# Patient Record
Sex: MALE | Race: BLACK OR AFRICAN AMERICAN | Employment: OTHER | URBAN - METROPOLITAN AREA
[De-identification: names, ages, dates, MRNs, and addresses within clinical notes are randomized per-mention and may not be internally consistent; named-entity substitution may affect disease eponyms.]

---

## 2017-04-06 ENCOUNTER — HOSPITAL ENCOUNTER (OUTPATIENT)
Dept: GENERAL RADIOLOGY | Facility: CLINIC | Age: 67
Discharge: HOME | End: 2017-04-06
Attending: INTERNAL MEDICINE | Admitting: INTERNAL MEDICINE
Payer: COMMERCIAL

## 2017-04-06 DIAGNOSIS — R05.9 COUGH: ICD-10-CM

## 2017-04-06 PROCEDURE — 71020 XR CHEST 2 VW: CPT

## 2017-06-05 DIAGNOSIS — R05.9 COUGH: Primary | ICD-10-CM

## 2017-06-05 DIAGNOSIS — R91.8 INFILTRATE OF LUNG PRESENT ON IMAGING OF CHEST: ICD-10-CM

## 2017-07-31 ENCOUNTER — PRE VISIT (OUTPATIENT)
Dept: OTOLARYNGOLOGY | Facility: CLINIC | Age: 67
End: 2017-07-31

## 2017-07-31 NOTE — TELEPHONE ENCOUNTER
1.  Date/reason for appt: 8/9/17 - Chronic Sinusitis   2.  Referring provider: [facility] Dr. Pearl  3.  Call to patient (Yes / No - short description): No  4.  Previous care at:    [facility] (saw Dr. Amador Spring 2016)    [facility]

## 2017-08-15 ENCOUNTER — PRE VISIT (OUTPATIENT)
Dept: PULMONOLOGY | Facility: CLINIC | Age: 67
End: 2017-08-15

## 2017-08-15 NOTE — TELEPHONE ENCOUNTER
1.  Date/reason for appt:8/18/17, Cough  2.  Referring provider: DAVINA MERINO  3.  Call to patient (Yes / No - short description): No, referred   4.  Previous care at / records requested from:   [facility]- faxed cover sheet.

## 2018-03-04 ENCOUNTER — MEDICAL CORRESPONDENCE (OUTPATIENT)
Dept: HEALTH INFORMATION MANAGEMENT | Facility: CLINIC | Age: 68
End: 2018-03-04

## 2018-03-06 ENCOUNTER — TELEPHONE (OUTPATIENT)
Dept: GASTROENTEROLOGY | Facility: CLINIC | Age: 68
End: 2018-03-06

## 2018-03-08 ENCOUNTER — TELEPHONE (OUTPATIENT)
Dept: GASTROENTEROLOGY | Facility: CLINIC | Age: 68
End: 2018-03-08

## 2018-03-08 ENCOUNTER — TRANSFERRED RECORDS (OUTPATIENT)
Dept: HEALTH INFORMATION MANAGEMENT | Facility: CLINIC | Age: 68
End: 2018-03-08

## 2018-03-08 ENCOUNTER — MEDICAL CORRESPONDENCE (OUTPATIENT)
Dept: HEALTH INFORMATION MANAGEMENT | Facility: CLINIC | Age: 68
End: 2018-03-08

## 2018-03-23 ENCOUNTER — PRE VISIT (OUTPATIENT)
Dept: GASTROENTEROLOGY | Facility: CLINIC | Age: 68
End: 2018-03-23

## 2018-03-23 NOTE — TELEPHONE ENCOUNTER
APPT INFO    Date /Time: 3/23/18 11:45AM    Reason for Appt: GERD dysphagia & constipation    Ref Provider/Clinic: ANGELIQUE RODRIGUEZ, [facility]   Are there internal records? Yes/No?  IF YES, list clinic names: No related recs or images in Epic    Are there outside records? Yes/No? Yes    Patient Contact (Y/N) & Call Details: No, pt was referred.    Action: Closing encounter      OUTSIDE RECORDS CHECKLIST     CLINIC NAME COMMENTS REC (x) IMG (x)   [facility] Referral scanned in Epic  X  None

## 2018-04-25 NOTE — TELEPHONE ENCOUNTER
FUTURE VISIT INFORMATION      FUTURE VISIT INFORMATION:    Date: 05/01/2018    Time: 10:30    [place] [facility] [place] INFORMATION:    Referring provider:  Dr. Vasiliy Paul     Referring providers clinic:  [facility]    Reason for visit/diagnosis  Dysphagia, throat pain    RECORDS REQUESTED FROM:       Clinic name Comments Records Status Imaging Status   [facility] OFFICE VISIT: 03/08/2018-01/05/2018 EXTERNAL NONE   [facility] [place]: 05/27/2016  IMAGE: CT MAXILLOFACIAL 06/30/2017  CT CHEST 06/30/2017, 04/06/2017,10/31/2016   INTERNAL YES                             RECORDS STATUS

## 2018-05-01 ENCOUNTER — PRE VISIT (OUTPATIENT)
Dept: OTOLARYNGOLOGY | Facility: CLINIC | Age: 68
End: 2018-05-01

## 2020-06-19 ENCOUNTER — HOSPITAL ENCOUNTER (OUTPATIENT)
Dept: LAB | Facility: CLINIC | Age: 70
End: 2020-06-19
Payer: COMMERCIAL

## 2020-09-10 ENCOUNTER — HOSPITAL ENCOUNTER (EMERGENCY)
Facility: CLINIC | Age: 70
Discharge: HOME | End: 2020-09-10
Attending: EMERGENCY MEDICINE | Admitting: EMERGENCY MEDICINE
Payer: COMMERCIAL

## 2020-09-10 ENCOUNTER — APPOINTMENT (OUTPATIENT)
Dept: INTERPRETER SERVICES | Facility: CLINIC | Age: 70
End: 2020-09-10
Payer: COMMERCIAL

## 2020-09-10 VITALS
TEMPERATURE: 97.7 F | OXYGEN SATURATION: 95 % | RESPIRATION RATE: 14 BRPM | HEART RATE: 80 BPM | SYSTOLIC BLOOD PRESSURE: 143 MMHG | DIASTOLIC BLOOD PRESSURE: 83 MMHG

## 2020-09-10 DIAGNOSIS — R50.9 SUBJECTIVE FEVER: ICD-10-CM

## 2020-09-10 DIAGNOSIS — E87.29 INCREASED ANION GAP METABOLIC ACIDOSIS: ICD-10-CM

## 2020-09-10 DIAGNOSIS — Z91.158 NON-COMPLIANCE WITH RENAL DIALYSIS (H): ICD-10-CM

## 2020-09-10 LAB
ALBUMIN SERPL-MCNC: 4.4 G/DL (ref 3.4–5)
ALP SERPL-CCNC: 66 U/L (ref 40–150)
ALT SERPL W P-5'-P-CCNC: 16 U/L (ref 0–70)
ANION GAP SERPL CALCULATED.3IONS-SCNC: 17 MMOL/L (ref 3–14)
AST SERPL W P-5'-P-CCNC: 11 U/L (ref 0–45)
BASE DEFICIT BLDV-SCNC: 15 MMOL/L
BASOPHILS # BLD AUTO: 0 10E9/L (ref 0–0.2)
BASOPHILS NFR BLD AUTO: 0.4 %
BILIRUB SERPL-MCNC: 0.4 MG/DL (ref 0.2–1.3)
BUN SERPL-MCNC: 165 MG/DL (ref 7–30)
CALCIUM SERPL-MCNC: 9.1 MG/DL (ref 8.5–10.1)
CHLORIDE SERPL-SCNC: 106 MMOL/L (ref 94–109)
CO2 SERPL-SCNC: 13 MMOL/L (ref 20–32)
CREAT SERPL-MCNC: 14.6 MG/DL (ref 0.66–1.25)
DIFFERENTIAL METHOD BLD: ABNORMAL
EOSINOPHIL # BLD AUTO: 0.2 10E9/L (ref 0–0.7)
EOSINOPHIL NFR BLD AUTO: 5.4 %
ERYTHROCYTE [DISTWIDTH] IN BLOOD BY AUTOMATED COUNT: 14.6 % (ref 10–15)
GFR SERPL CREATININE-BSD FRML MDRD: 3 ML/MIN/{1.73_M2}
GLUCOSE SERPL-MCNC: 162 MG/DL (ref 70–99)
HCO3 BLDV-SCNC: 13 MMOL/L (ref 21–28)
HCT VFR BLD AUTO: 29.7 % (ref 40–53)
HGB BLD-MCNC: 9.7 G/DL (ref 13.3–17.7)
IMM GRANULOCYTES # BLD: 0 10E9/L (ref 0–0.4)
IMM GRANULOCYTES NFR BLD: 0.2 %
INTERPRETATION ECG - MUSE: NORMAL
LABORATORY COMMENT REPORT: NORMAL
LYMPHOCYTES # BLD AUTO: 0.8 10E9/L (ref 0.8–5.3)
LYMPHOCYTES NFR BLD AUTO: 18.3 %
MAGNESIUM SERPL-MCNC: 2.9 MG/DL (ref 1.6–2.3)
MCH RBC QN AUTO: 28.7 PG (ref 26.5–33)
MCHC RBC AUTO-ENTMCNC: 32.7 G/DL (ref 31.5–36.5)
MCV RBC AUTO: 88 FL (ref 78–100)
MONOCYTES # BLD AUTO: 0.3 10E9/L (ref 0–1.3)
MONOCYTES NFR BLD AUTO: 6.5 %
NEUTROPHILS # BLD AUTO: 3.1 10E9/L (ref 1.6–8.3)
NEUTROPHILS NFR BLD AUTO: 69.2 %
NRBC # BLD AUTO: 0 10*3/UL
NRBC BLD AUTO-RTO: 0 /100
O2/TOTAL GAS SETTING VFR VENT: 21 %
PCO2 BLDV: 34 MM HG (ref 40–50)
PH BLDV: 7.17 PH (ref 7.32–7.43)
PHOSPHATE SERPL-MCNC: 8.8 MG/DL (ref 2.5–4.5)
PLATELET # BLD AUTO: 229 10E9/L (ref 150–450)
PO2 BLDV: 18 MM HG (ref 25–47)
POTASSIUM SERPL-SCNC: 4.7 MMOL/L (ref 3.4–5.3)
PROT SERPL-MCNC: 9.6 G/DL (ref 6.8–8.8)
RBC # BLD AUTO: 3.38 10E12/L (ref 4.4–5.9)
SARS-COV-2 RNA SPEC QL NAA+PROBE: NEGATIVE
SARS-COV-2 RNA SPEC QL NAA+PROBE: NORMAL
SODIUM SERPL-SCNC: 136 MMOL/L (ref 133–144)
SPECIMEN SOURCE: NORMAL
SPECIMEN SOURCE: NORMAL
WBC # BLD AUTO: 4.5 10E9/L (ref 4–11)

## 2020-09-10 PROCEDURE — 83735 ASSAY OF MAGNESIUM: CPT | Performed by: EMERGENCY MEDICINE

## 2020-09-10 PROCEDURE — 99284 EMERGENCY DEPT VISIT MOD MDM: CPT | Mod: 25 | Performed by: EMERGENCY MEDICINE

## 2020-09-10 PROCEDURE — 93010 ELECTROCARDIOGRAM REPORT: CPT | Mod: Z6 | Performed by: EMERGENCY MEDICINE

## 2020-09-10 PROCEDURE — 80053 COMPREHEN METABOLIC PANEL: CPT | Performed by: EMERGENCY MEDICINE

## 2020-09-10 PROCEDURE — 87040 BLOOD CULTURE FOR BACTERIA: CPT | Performed by: EMERGENCY MEDICINE

## 2020-09-10 PROCEDURE — U0003 INFECTIOUS AGENT DETECTION BY NUCLEIC ACID (DNA OR RNA); SEVERE ACUTE RESPIRATORY SYNDROME CORONAVIRUS 2 (SARS-COV-2) (CORONAVIRUS DISEASE [COVID-19]), AMPLIFIED PROBE TECHNIQUE, MAKING USE OF HIGH THROUGHPUT TECHNOLOGIES AS DESCRIBED BY CMS-2020-01-R: HCPCS | Performed by: EMERGENCY MEDICINE

## 2020-09-10 PROCEDURE — 84100 ASSAY OF PHOSPHORUS: CPT | Performed by: EMERGENCY MEDICINE

## 2020-09-10 PROCEDURE — 93005 ELECTROCARDIOGRAM TRACING: CPT | Performed by: EMERGENCY MEDICINE

## 2020-09-10 PROCEDURE — 82803 BLOOD GASES ANY COMBINATION: CPT | Performed by: EMERGENCY MEDICINE

## 2020-09-10 PROCEDURE — C9803 HOPD COVID-19 SPEC COLLECT: HCPCS | Performed by: EMERGENCY MEDICINE

## 2020-09-10 PROCEDURE — 85025 COMPLETE CBC W/AUTO DIFF WBC: CPT | Performed by: EMERGENCY MEDICINE

## 2020-09-10 SDOH — HEALTH STABILITY: MENTAL HEALTH: HOW OFTEN DO YOU HAVE A DRINK CONTAINING ALCOHOL?: NEVER

## 2020-09-10 ASSESSMENT — ENCOUNTER SYMPTOMS
CHILLS: 1
SORE THROAT: 1
COUGH: 0
RHINORRHEA: 1
FEVER: 1
SHORTNESS OF BREATH: 0
DYSURIA: 1
ABDOMINAL PAIN: 0
VOMITING: 0

## 2020-09-10 NOTE — ED NOTES
Pt seen dressed in street clothes heading toward exit. Stopped by staff, pt states, (through Ukrainian-speaking staff) that he has been waiting too long and wants to leave. Staff explained that MD would have to discuss health risks related to leaving through  before he can go. After much discussion, pt agreed to wait in room for MD and . [place] and drink given.  MD states she would like to speak with dialysis fellow before discharging pt AMA.

## 2020-09-10 NOTE — ED NOTES
Call 7B and spoke to charge. Charge reported that she doesn't have a clean room yet and that she will call back when she has more information

## 2020-09-10 NOTE — ED NOTES
Pt voided ~300mls in urinal. Urine was dumped in toilet before specimen was collected. Will attempt to collect urine spec upon next void.

## 2020-09-10 NOTE — ED NOTES
Susi charge on 7D called to update writer that they are not able to talk about taking the patient until after 1900.

## 2020-09-10 NOTE — ED NOTES
Writer had a conversation with patient via  about going to dialysis. Patient agreed. Patient understands that it may be an hour or so as the MD has to have a conversation. Patient wants to have some food and writer confirmed that it was ordered

## 2020-09-10 NOTE — ED PROVIDER NOTES
"ED Provider Note  [facility]      History     Chief Complaint   Patient presents with     Otalgia     Pt reports bilateral ear pain for \"long time,\" also states he has not had dialysis in over a month and feels sick     The history is provided by the patient, medical records and a friend.     Gemma Kessler is a 70 year old male with a past medical history significant for ESRD, DM2, HTN, renal cell carcinoma of left kidney s/p left nephrectomy, and hyperkalemia who presents here to the Emergency Department due to hearing loss and missed dialysis.  Patient reports bilateral hearing loss that he has been experiencing for two years that is worsening. He initially presented with c/o ear pain but says that it improved significantly since he arrived.  He states he has not been to dialysis in over one month because he did not like the center he went to and now he feels sick. He has been having fevers and chills.  Patient states he is still making urine.  Patient endorses burning and pain with urination.  Patient reports rhinorrhea and sore throat, but notes it might be due to allergies. Patient denies vomiting, chest pain, shortness of breath, and abdominal pain.  Patient denies smoking.  Patient states he normally goes to [facility]. He has not been doing dialysis because he does not like the center where he goes to get dialysis done.     Past Medical History  Past Medical History:   Diagnosis Date     Chronic sinusitis      Past Surgical History:   Procedure Laterality Date     ENT SURGERY       No current outpatient medications on file.    No Known Allergies  Family History  History reviewed. No pertinent family history.  Social History   Social History     Tobacco Use     Smoking status: Never Smoker     Smokeless tobacco: Never Used   Substance Use Topics     Alcohol use: Never     Frequency: Never     Drug use: Never      Past medical history, past surgical history, " medications, allergies, family history, and social history were reviewed with the patient. No additional pertinent items.       Review of Systems   Constitutional: Positive for chills and fever.   HENT: Positive for ear pain, hearing loss, rhinorrhea and sore throat.    Respiratory: Negative for cough and shortness of breath.    Cardiovascular: Negative for chest pain.   Gastrointestinal: Negative for abdominal pain and vomiting.   Genitourinary: Positive for dysuria.   All other systems reviewed and are negative.    A complete review of systems was performed with pertinent positives and negatives noted in the HPI, and all other systems negative.    Physical Exam   BP: (!) 144/69  Pulse: 80  Temp: 97.7  F (36.5  C)  Resp: 14  SpO2: 95 %  Physical Exam  Vitals signs and nursing note reviewed.   Constitutional:       General: He is not in acute distress.     Appearance: Normal appearance. He is well-developed and normal weight. He is not ill-appearing or diaphoretic.   HENT:      Head: Normocephalic and atraumatic.      Right Ear: Tympanic membrane, ear canal and external ear normal. There is no impacted cerumen.      Left Ear: Tympanic membrane, ear canal and external ear normal. There is no impacted cerumen.      Nose: Nose normal.      Mouth/Throat:      Mouth: Mucous membranes are dry.      Pharynx: Oropharynx is clear.   Eyes:      General: No scleral icterus.     Conjunctiva/sclera: Conjunctivae normal.   Neck:      Musculoskeletal: Normal range of motion and neck supple. No neck rigidity.   Cardiovascular:      Rate and Rhythm: Normal rate.   Pulmonary:      Effort: Pulmonary effort is normal. No respiratory distress.      Breath sounds: No stridor.   Chest:          Comments: Tunneled line in right apical chest wall. Labeled 8/19.   Abdominal:      General: Abdomen is flat. There is no distension.      Palpations: Abdomen is soft.      Tenderness: There is no abdominal tenderness. There is no guarding or  rebound.   Musculoskeletal: Normal range of motion.         General: No deformity or signs of injury.      Right lower leg: No edema.      Left lower leg: No edema.   Skin:     General: Skin is warm and dry.      Coloration: Skin is not jaundiced.      Findings: No bruising, erythema or rash.   Neurological:      General: No focal deficit present.      Mental Status: He is alert and oriented to person, place, and time.   Psychiatric:         Attention and Perception: Attention normal.         Mood and Affect: Mood normal.         Behavior: Behavior normal.         Thought Content: Thought content normal.         ED Course     12:12 PM  The patient was seen and examined by Smiley Kilpatrick MD in Room ED07.    Procedures             EKG Interpretation:      Interpreted by Smiley Kilpatrick MD  Time reviewed: 12:34  Symptoms at time of EKG: missed dialysis   Rhythm: normal sinus   Rate: normal  Axis: normal  Ectopy: none  Conduction: normal  ST Segments/ T Waves: No ST-T wave changes  Q Waves: none  Comparison to prior: No old EKG available    Clinical Impression: normal EKG                      Results for orders placed or performed during the hospital encounter of 09/10/20   CBC with platelets differential     Status: Abnormal   Result Value Ref Range    WBC 4.5 4.0 - 11.0 10e9/L    RBC Count 3.38 (L) 4.4 - 5.9 10e12/L    Hemoglobin 9.7 (L) 13.3 - 17.7 g/dL    Hematocrit 29.7 (L) 40.0 - 53.0 %    MCV 88 78 - 100 fl    MCH 28.7 26.5 - 33.0 pg    MCHC 32.7 31.5 - 36.5 g/dL    RDW 14.6 10.0 - 15.0 %    Platelet Count 229 150 - 450 10e9/L    Diff Method Automated Method     % Neutrophils 69.2 %    % Lymphocytes 18.3 %    % Monocytes 6.5 %    % Eosinophils 5.4 %    % Basophils 0.4 %    % Immature Granulocytes 0.2 %    Nucleated RBCs 0 0 /100    Absolute Neutrophil 3.1 1.6 - 8.3 10e9/L    Absolute Lymphocytes 0.8 0.8 - 5.3 10e9/L    Absolute Monocytes 0.3 0.0 - 1.3 10e9/L    Absolute Eosinophils 0.2 0.0 - 0.7 10e9/L     Absolute Basophils 0.0 0.0 - 0.2 10e9/L    Abs Immature Granulocytes 0.0 0 - 0.4 10e9/L    Absolute Nucleated RBC 0.0    Comprehensive metabolic panel     Status: Abnormal   Result Value Ref Range    Sodium 136 133 - 144 mmol/L    Potassium 4.7 3.4 - 5.3 mmol/L    Chloride 106 94 - 109 mmol/L    Carbon Dioxide 13 (L) 20 - 32 mmol/L    Anion Gap 17 (H) 3 - 14 mmol/L    Glucose 162 (H) 70 - 99 mg/dL    Urea Nitrogen 165 (H) 7 - 30 mg/dL    Creatinine 14.60 (H) 0.66 - 1.25 mg/dL    GFR Estimate 3 (L) >60 mL/min/[1.73_m2]    GFR Estimate If Black 3 (L) >60 mL/min/[1.73_m2]    Calcium 9.1 8.5 - 10.1 mg/dL    Bilirubin Total 0.4 0.2 - 1.3 mg/dL    Albumin 4.4 3.4 - 5.0 g/dL    Protein Total 9.6 (H) 6.8 - 8.8 g/dL    Alkaline Phosphatase 66 40 - 150 U/L    ALT 16 0 - 70 U/L    AST 11 0 - 45 U/L   Blood gas venous     Status: Abnormal   Result Value Ref Range    Ph Venous 7.17 (LL) 7.32 - 7.43 pH    PCO2 Venous 34 (L) 40 - 50 mm Hg    PO2 Venous 18 (L) 25 - 47 mm Hg    Bicarbonate Venous 13 (L) 21 - 28 mmol/L    Base Deficit Venous 15.0 mmol/L    FIO2 21    Asymptomatic COVID-19 Virus (Coronavirus) by PCR     Status: None    Specimen: Nasopharyngeal   Result Value Ref Range    COVID-19 Virus PCR to [facility] - Source Nasopharyngeal     COVID-19 Virus PCR to [facility] - Result       Test received-See reflex to IDDL test SARS CoV2 (COVID-19) Virus RT-PCR   Magnesium     Status: Abnormal   Result Value Ref Range    Magnesium 2.9 (H) 1.6 - 2.3 mg/dL   Phosphorus     Status: Abnormal   Result Value Ref Range    Phosphorus 8.8 (H) 2.5 - 4.5 mg/dL   SARS-CoV-2 COVID-19 Virus (Coronavirus) RT-PCR Nasopharyngeal     Status: None    Specimen: Nasopharyngeal   Result Value Ref Range    SARS-CoV-2 Virus Specimen Source Nasopharyngeal     SARS-CoV-2 PCR Result NEGATIVE     SARS-CoV-2 PCR Comment       Testing was performed using the [employer] Xpress SARS-CoV-2 Assay on the Cepheid Gene-Xpert   Instrument Systems. Additional information about  this Emergency Use Authorization (EUA)   assay can be found via the Lab Guide.     EKG 12-lead, tracing only     Status: None   Result Value Ref Range    Interpretation ECG Click View Image link to view waveform and result    Blood culture     Status: None (Preliminary result)    Specimen: Arm, Left; Blood    Left Arm   Result Value Ref Range    Specimen Description Blood Left Arm     Culture Micro PENDING    Blood culture     Status: None (Preliminary result)    Specimen: Arm; Blood    Right Arm   Result Value Ref Range    Specimen Description Blood Right Arm     Culture Micro PENDING      Medications - No data to display     Assessments & Plan (with Medical Decision Making)   Gemma Kessler is a 70 year old male with a past medical history significant for ESRD, DM2, HTN, renal cell carcinoma of left kidney s/p left nephrectomy, and hyperkalemia who presents here to the Emergency Department due to hearing loss and missed dialysis.     Ddx: hyperkalemia, uremia, hypervolemia, acidosis, UTI, bacteremia     Patient with generalized malaise after noncompliance with hemodialysis. Discussed he will likely need to be admitted to be dialyzed on [place] and patient agreed with this plan. Reports subjective fevers/chills but afebrile and well appearing in ED. I did discuss the danger of skipping dialysis including sudden cardiac death from hyperkalemia. Patient verbalized understanding. He would like to establish care at a different dialysis center.     Labs as above notable for nl potassium. Severe AG metabolic acidosis with pH 7.17. normocytic anemia likely from CKD. . EKG w/o arrhythmia. No acute ischemic changes. Asymptomatic COVID sent. Blood cultures sent for w/u reported fevers/chills. Clinically no e/o sepsis currently but does have central access which places patient at risk for bacteremia. Discussed with triage hospitalist and dialysis fellow on a three way call with [facility]. Will admit to [facility]  with plan to dialyze, probably not until tomorrow morning due to bed situation. They will look into sending patient to dialysis unit for dialysis where he could potentially await bed placement on medicine unit or he could be dialyzed and transferred back to inpatient bed on [facility]. Further disposition planning per triage hospitalist, nephrology, and PPM. Discussed giving bicarb, and nephrology recommended holding since patient is stable.     5:07 PM Patient wants to leave AMA. Called back PPM and other providers to see what the options were for getting patient dialyzed.     5:57 PM PPM called back with current triage hospitalist.  Per patient placement, patient's bed will be ready for him on 7D within an hour.  They are working on contacting the nephrology fellow to see if they can arrange for patient to be transferred to the dialysis unit to get dialyzed before he gets transferred to .  Otherwise the plan is to have him continue to go to 7D until the dialysis center is ready for him.  Patient updated on the plan for admitting him within the hour.  He is aware of the risks of leaving without dialysis.  I have signed out to my partner at shift change with patient's disposition pending transport to the [place].    I have reviewed the nursing notes. I have reviewed the findings, diagnosis, plan and need for follow up with the patient.    New Prescriptions    No medications on file       Final diagnoses:   Increased anion gap metabolic acidosis   Non-compliance with renal dialysis (H)   Subjective fever   I, Sally Lyons, am serving as a trained medical scribe to document services personally performed by Smiley Kilpatrick MD, based on the provider's statements to me.     ISmiley MD, was physically present and have reviewed and verified the accuracy of this note documented by Sally Lyons.     --  Smiley Kilpatrick MD  [facility], [place], EMERGENCY DEPARTMENT  9/10/2020     Smiley Kilpatrick MD  09/10/20  1750

## 2020-09-10 NOTE — ED NOTES
Writer, patient and MD Kilpatrick had a lengthy conversation about the risk associated leaving AMA. After much conversation patient verbalized that he understood the risks of leaving and that he would be back in the morning. Though the demonstrated understanding that he may have to wait many hours

## 2020-09-11 ENCOUNTER — HOSPITAL ENCOUNTER (EMERGENCY)
Facility: CLINIC | Age: 70
Discharge: ANOTHER HOSPITAL | End: 2020-09-11
Attending: EMERGENCY MEDICINE | Admitting: EMERGENCY MEDICINE
Payer: COMMERCIAL

## 2020-09-11 ENCOUNTER — HOSPITAL ENCOUNTER (OUTPATIENT)
Facility: CLINIC | Age: 70
Setting detail: OBSERVATION
Discharge: HOME | End: 2020-09-15
Attending: INTERNAL MEDICINE | Admitting: INTERNAL MEDICINE
Payer: COMMERCIAL

## 2020-09-11 ENCOUNTER — APPOINTMENT (OUTPATIENT)
Dept: INTERPRETER SERVICES | Facility: CLINIC | Age: 70
End: 2020-09-11
Payer: COMMERCIAL

## 2020-09-11 VITALS
RESPIRATION RATE: 16 BRPM | DIASTOLIC BLOOD PRESSURE: 75 MMHG | SYSTOLIC BLOOD PRESSURE: 154 MMHG | WEIGHT: 153.2 LBS | TEMPERATURE: 96.5 F | HEART RATE: 76 BPM | OXYGEN SATURATION: 99 %

## 2020-09-11 DIAGNOSIS — N19 UREMIA: ICD-10-CM

## 2020-09-11 DIAGNOSIS — N18.6 ESRD (END STAGE RENAL DISEASE) ON DIALYSIS (H): ICD-10-CM

## 2020-09-11 DIAGNOSIS — Z99.2 ESRD (END STAGE RENAL DISEASE) ON DIALYSIS (H): ICD-10-CM

## 2020-09-11 LAB
ANION GAP SERPL CALCULATED.3IONS-SCNC: 17 MMOL/L (ref 3–14)
BASE DEFICIT BLDV-SCNC: 14.6 MMOL/L
BASOPHILS # BLD AUTO: 0 10E9/L (ref 0–0.2)
BASOPHILS NFR BLD AUTO: 0.4 %
BUN SERPL-MCNC: 160 MG/DL (ref 7–30)
CALCIUM SERPL-MCNC: 9.1 MG/DL (ref 8.5–10.1)
CHLORIDE SERPL-SCNC: 108 MMOL/L (ref 94–109)
CO2 SERPL-SCNC: 14 MMOL/L (ref 20–32)
CREAT SERPL-MCNC: 15.2 MG/DL (ref 0.66–1.25)
DIFFERENTIAL METHOD BLD: ABNORMAL
EOSINOPHIL # BLD AUTO: 0.2 10E9/L (ref 0–0.7)
EOSINOPHIL NFR BLD AUTO: 5.1 %
ERYTHROCYTE [DISTWIDTH] IN BLOOD BY AUTOMATED COUNT: 14.6 % (ref 10–15)
GFR SERPL CREATININE-BSD FRML MDRD: 3 ML/MIN/{1.73_M2}
GLUCOSE SERPL-MCNC: 105 MG/DL (ref 70–99)
HCO3 BLDV-SCNC: 12 MMOL/L (ref 21–28)
HCT VFR BLD AUTO: 27.5 % (ref 40–53)
HGB BLD-MCNC: 9.4 G/DL (ref 13.3–17.7)
IMM GRANULOCYTES # BLD: 0 10E9/L (ref 0–0.4)
IMM GRANULOCYTES NFR BLD: 0.2 %
LYMPHOCYTES # BLD AUTO: 1 10E9/L (ref 0.8–5.3)
LYMPHOCYTES NFR BLD AUTO: 22.1 %
MCH RBC QN AUTO: 29.8 PG (ref 26.5–33)
MCHC RBC AUTO-ENTMCNC: 34.2 G/DL (ref 31.5–36.5)
MCV RBC AUTO: 87 FL (ref 78–100)
MONOCYTES # BLD AUTO: 0.5 10E9/L (ref 0–1.3)
MONOCYTES NFR BLD AUTO: 10.2 %
NEUTROPHILS # BLD AUTO: 2.9 10E9/L (ref 1.6–8.3)
NEUTROPHILS NFR BLD AUTO: 62 %
NRBC # BLD AUTO: 0 10*3/UL
NRBC BLD AUTO-RTO: 0 /100
O2/TOTAL GAS SETTING VFR VENT: 21 %
PCO2 BLDV: 32 MM HG (ref 40–50)
PH BLDV: 7.2 PH (ref 7.32–7.43)
PLATELET # BLD AUTO: 214 10E9/L (ref 150–450)
PO2 BLDV: 25 MM HG (ref 25–47)
POTASSIUM SERPL-SCNC: 4.5 MMOL/L (ref 3.4–5.3)
RBC # BLD AUTO: 3.15 10E12/L (ref 4.4–5.9)
SODIUM SERPL-SCNC: 139 MMOL/L (ref 133–144)
WBC # BLD AUTO: 4.7 10E9/L (ref 4–11)

## 2020-09-11 PROCEDURE — 99284 EMERGENCY DEPT VISIT MOD MDM: CPT | Mod: Z6 | Performed by: EMERGENCY MEDICINE

## 2020-09-11 PROCEDURE — 90937 HEMODIALYSIS REPEATED EVAL: CPT

## 2020-09-11 PROCEDURE — 80048 BASIC METABOLIC PNL TOTAL CA: CPT | Performed by: EMERGENCY MEDICINE

## 2020-09-11 PROCEDURE — 99207 ZZC CDG-CODE CATEGORY CHANGED: CPT | Performed by: PHYSICIAN ASSISTANT

## 2020-09-11 PROCEDURE — 85025 COMPLETE CBC W/AUTO DIFF WBC: CPT | Performed by: EMERGENCY MEDICINE

## 2020-09-11 PROCEDURE — G0257 UNSCHED DIALYSIS ESRD PT HOS: HCPCS

## 2020-09-11 PROCEDURE — 96374 THER/PROPH/DIAG INJ IV PUSH: CPT | Performed by: EMERGENCY MEDICINE

## 2020-09-11 PROCEDURE — G0378 HOSPITAL OBSERVATION PER HR: HCPCS

## 2020-09-11 PROCEDURE — 25000128 H RX IP 250 OP 636: Performed by: EMERGENCY MEDICINE

## 2020-09-11 PROCEDURE — 25800030 ZZH RX IP 258 OP 636: Performed by: INTERNAL MEDICINE

## 2020-09-11 PROCEDURE — 99220 ZZC INITIAL OBSERVATION CARE,LEVL III: CPT | Performed by: PHYSICIAN ASSISTANT

## 2020-09-11 PROCEDURE — 99285 EMERGENCY DEPT VISIT HI MDM: CPT | Mod: 25 | Performed by: EMERGENCY MEDICINE

## 2020-09-11 PROCEDURE — 82803 BLOOD GASES ANY COMBINATION: CPT | Performed by: EMERGENCY MEDICINE

## 2020-09-11 RX ORDER — ONDANSETRON 4 MG/1
4 TABLET, ORALLY DISINTEGRATING ORAL EVERY 6 HOURS PRN
Status: DISCONTINUED | OUTPATIENT
Start: 2020-09-11 | End: 2020-09-15 | Stop reason: HOSPADM

## 2020-09-11 RX ORDER — ONDANSETRON 2 MG/ML
4 INJECTION INTRAMUSCULAR; INTRAVENOUS EVERY 6 HOURS PRN
Status: DISCONTINUED | OUTPATIENT
Start: 2020-09-11 | End: 2020-09-15 | Stop reason: HOSPADM

## 2020-09-11 RX ORDER — CETIRIZINE HYDROCHLORIDE 10 MG/1
10 TABLET ORAL DAILY
COMMUNITY

## 2020-09-11 RX ORDER — NALOXONE HYDROCHLORIDE 0.4 MG/ML
.1-.4 INJECTION, SOLUTION INTRAMUSCULAR; INTRAVENOUS; SUBCUTANEOUS
Status: DISCONTINUED | OUTPATIENT
Start: 2020-09-11 | End: 2020-09-15 | Stop reason: HOSPADM

## 2020-09-11 RX ORDER — CALCIUM ACETATE 667 MG/1
667 CAPSULE ORAL
COMMUNITY

## 2020-09-11 RX ORDER — AMOXICILLIN 500 MG/1
500 CAPSULE ORAL DAILY
Status: ON HOLD | COMMUNITY
End: 2020-09-12

## 2020-09-11 RX ORDER — ACETAMINOPHEN 650 MG/1
650 SUPPOSITORY RECTAL EVERY 4 HOURS PRN
Status: DISCONTINUED | OUTPATIENT
Start: 2020-09-11 | End: 2020-09-15 | Stop reason: HOSPADM

## 2020-09-11 RX ORDER — AMLODIPINE BESYLATE 10 MG/1
10 TABLET ORAL DAILY
Status: ON HOLD | COMMUNITY
End: 2020-09-15

## 2020-09-11 RX ORDER — ONDANSETRON 2 MG/ML
4 INJECTION INTRAMUSCULAR; INTRAVENOUS EVERY 30 MIN PRN
Status: DISCONTINUED | OUTPATIENT
Start: 2020-09-11 | End: 2020-09-11 | Stop reason: HOSPADM

## 2020-09-11 RX ORDER — ACETAMINOPHEN 325 MG/1
650 TABLET ORAL EVERY 4 HOURS PRN
Status: DISCONTINUED | OUTPATIENT
Start: 2020-09-11 | End: 2020-09-15 | Stop reason: HOSPADM

## 2020-09-11 RX ORDER — ALBUMIN (HUMAN) 12.5 G/50ML
50 SOLUTION INTRAVENOUS
Status: DISCONTINUED | OUTPATIENT
Start: 2020-09-11 | End: 2020-09-11

## 2020-09-11 RX ORDER — TAMSULOSIN HYDROCHLORIDE 0.4 MG/1
0.4 CAPSULE ORAL DAILY
COMMUNITY

## 2020-09-11 RX ORDER — NITROGLYCERIN 0.4 MG/1
0.4 TABLET SUBLINGUAL EVERY 5 MIN PRN
COMMUNITY

## 2020-09-11 RX ORDER — PROCHLORPERAZINE 25 MG
12.5 SUPPOSITORY, RECTAL RECTAL EVERY 12 HOURS PRN
Status: DISCONTINUED | OUTPATIENT
Start: 2020-09-11 | End: 2020-09-15 | Stop reason: HOSPADM

## 2020-09-11 RX ORDER — AMOXICILLIN 250 MG
1 CAPSULE ORAL 2 TIMES DAILY PRN
Status: DISCONTINUED | OUTPATIENT
Start: 2020-09-11 | End: 2020-09-15 | Stop reason: HOSPADM

## 2020-09-11 RX ORDER — PROCHLORPERAZINE MALEATE 5 MG
5 TABLET ORAL EVERY 6 HOURS PRN
Status: DISCONTINUED | OUTPATIENT
Start: 2020-09-11 | End: 2020-09-15 | Stop reason: HOSPADM

## 2020-09-11 RX ORDER — AMOXICILLIN 250 MG
2 CAPSULE ORAL 2 TIMES DAILY PRN
Status: DISCONTINUED | OUTPATIENT
Start: 2020-09-11 | End: 2020-09-15 | Stop reason: HOSPADM

## 2020-09-11 RX ORDER — ALBUMIN, HUMAN INJ 5% 5 %
250 SOLUTION INTRAVENOUS
Status: DISCONTINUED | OUTPATIENT
Start: 2020-09-11 | End: 2020-09-11

## 2020-09-11 RX ORDER — CALCIUM CARBONATE 500 MG/1
1000 TABLET, CHEWABLE ORAL EVERY 4 HOURS PRN
Status: DISCONTINUED | OUTPATIENT
Start: 2020-09-11 | End: 2020-09-15 | Stop reason: HOSPADM

## 2020-09-11 RX ORDER — FLUTICASONE PROPIONATE 50 MCG
1 SPRAY, SUSPENSION (ML) NASAL DAILY
COMMUNITY

## 2020-09-11 RX ORDER — WARFARIN SODIUM 1 MG/1
1 TABLET ORAL DAILY
Status: ON HOLD | COMMUNITY
End: 2020-09-12

## 2020-09-11 RX ORDER — HYDROCHLOROTHIAZIDE 25 MG/1
25 TABLET ORAL DAILY
Status: ON HOLD | COMMUNITY
End: 2020-09-15

## 2020-09-11 RX ORDER — POLYETHYLENE GLYCOL 3350 17 G/17G
17 POWDER, FOR SOLUTION ORAL DAILY PRN
Status: DISCONTINUED | OUTPATIENT
Start: 2020-09-11 | End: 2020-09-15 | Stop reason: HOSPADM

## 2020-09-11 RX ORDER — ACETAMINOPHEN 500 MG
500-1000 TABLET ORAL EVERY 8 HOURS PRN
COMMUNITY

## 2020-09-11 RX ORDER — SIMVASTATIN 40 MG
40 TABLET ORAL AT BEDTIME
Status: ON HOLD | COMMUNITY
End: 2020-09-12

## 2020-09-11 RX ADMIN — ONDANSETRON 4 MG: 2 INJECTION INTRAMUSCULAR; INTRAVENOUS at 08:18

## 2020-09-11 RX ADMIN — SODIUM CHLORIDE 250 ML: 9 INJECTION, SOLUTION INTRAVENOUS at 17:43

## 2020-09-11 RX ADMIN — SODIUM CHLORIDE 300 ML: 9 INJECTION, SOLUTION INTRAVENOUS at 17:43

## 2020-09-11 RX ADMIN — Medication: at 17:43

## 2020-09-11 ASSESSMENT — ENCOUNTER SYMPTOMS
SHORTNESS OF BREATH: 0
ABDOMINAL PAIN: 0
FEVER: 0

## 2020-09-11 NOTE — PROGRESS NOTES
Admission    Patient arrives to room 621-1 via cart from Direct admit  Care plan note: Patient speaks German, jabber used for communication. Lungs clear on RA, vss. Creat/BUN elevated. Patient alert/orient X2-3. Dialysis to be done today.     Inpatient nursing criteria listed below were met:    PCD's Documented: NA  Skin issues/needs documented :Yes  Isolation education started/completed NA  Patient allergies verified with patient: NA  Verified completion of [place] Risk Assessment Tool:  Yes  Verified completion of Guardianship screening tool: Yes  Fall Prevention: Care plan updated, Education given and documented Yes  Care Plan initiated: Yes  Home medications documented in belongings flowsheet: NA  Patient belongings documented in belongings flowsheet: Yes  Reminder note (belongings/ medications) placed in discharge instructions:NA  Admission profile/ required documentation complete: NA  Bedside Report Letter given and explained to patient NA

## 2020-09-11 NOTE — H&P
"      [facility]    History and Physical - Hospitalist Service       Date of Admission:  9/11/2020    Assessment & Plan   Gmema Kessler is a 70 year old male with a past medical history of ESRD, diabetes mellitus type 2, renal cell carcinoma of the left kidney status post nephrectomy who presented to the emergency department at [facility] on 9/10/2020 with a chief complaint of bilateral otalgia, incidentally noting he had missed approximately 3 weeks of dialysis and felt ill.  Laboratory studies indicated a severe metabolic acidosis and need for urgent dialysis, patient initially left AMA due to wait times and then returned 9/11 with persistent generalized symptoms and requested admission for HD. Due to bed availability, pt was transferred to [facility] for treatment.    ESRD  Noncompliance  Metabolic acidosis, anion-gap  Previously arranged to undergo HD MWF, however patient describes \"issues\" with his dialysis center ([facility]). Unwilling to describe what said issues were, only indicates he requested transfer to an alternative facility which was not performed and therefore he decided to stop attending. Last HD run appears to be 8/26/20 per Care Everywhere. When asked if he had been in contact with his nephrologist ([facility]) regarding issues at HD center, pt does not answer. Labwork obtained in ED indicate uncompensated anion gap metabolic acidosis with , electrolytes surprisingly wnl.   - Nephrology consult  - Care Coordinator/SW consult to assist with alternative HD location    Hx RCC of left kidney s/p nephrectomy  Per summaries via [facility], was on palliative chemo earlier this year. Prior scans indicated bladder wall thickening. Unclear if this was followed up as an outpatient. Appears to have been lost to follow-up with Oncology/Urology.     Diabetes Mellitus Type II  Not on medications PTA. Last Hgb A1c is " from 2019.  - Repeat Hgb A1c  - Accuchecks QID, add ssi if needed    Hypertension  - PTA amlodipine    Hx HD catheter-related MSSA bacteremia  Hx IVC septic thrombophlebitis  Admission both at [facility] and [facility] 06/2020 (left AMA from [facility], presented to [facility] for definitive care). Suspected related to femoral HD catheter, findings of occlusive iliac and non-occlusive IVC thrombus presumed septic. Also complicated by septic lung abscesses. TTE & JUNIOR neg for vegetations. Completed 6-weeks of antibiotic tx, previously on warfarin tx for thrombus. No longer on chronic AC.    COVID rule-out  Asymptomatic PCR performed 9/10, NEGATIVE.     Diet: Renal Diet (non-dialysis)    DVT Prophylaxis: Pneumatic Compression Devices  Sahu Catheter: not present  Code Status: Full Code           Disposition Plan   Expected discharge: 1-2 days, recommended to prior living arrangement once outpatient HD arranged, cleared by nephrology.  Entered: Maynor Israel PA-C 09/11/2020, 1:52 PM     The patient's care was discussed with the Attending Physician, Dr. Mcdonough, Bedside Nurse and Patient.    Maynor Israel PA-C  [facility]    ______________________________________________________________________    Chief Complaint   Abnormal labs    History is obtained from the patient, EMR    History of Present Illness   Gemma Kessler is a 70 year old male with a past medical history of ESRD, diabetes mellitus type 2, renal cell carcinoma of the left kidney status post nephrectomy who presented to the emergency department at [facility] on 9/10/2020 with a chief complaint of bilateral otalgia, incidentally noting he had missed approximately 3 weeks of dialysis and felt ill.  Laboratory studies indicated a severe metabolic acidosis and need for urgent dialysis, patient initially left AMA due to wait times and then returned 9/11 with persistent generalized symptoms and requested admission for HD. Due to bed  "availability, pt was transferred to [facility] for treatment.    Patient is presently evaluated in hospital room. History obtained via  services on iPad. Patient solely indicates he \"has not been feeling well.\" Does not give additional symptoms. When asked why he has not been attending dialysis, he indicates having problems with his current clinic and states he has asked to be transferred multiple times without effect. Due to frustration, he decided to stop going to dialysis. For unclear reasons, he did not contact his nephrologist or attempt to find an alternative means to obtain hemodialysis. Denies fever, chills, cough. Is making small amount of urine. Denies back/flank pain or discomfort, nausea/vomiting, chest pain.     Review of Systems    The 10 point Review of Systems is negative other than noted in the HPI or here.     Past Medical History    I have reviewed this patient's medical history and updated it with pertinent information if needed.   Past Medical History:   Diagnosis Date     Chronic kidney disease      Chronic sinusitis        Past Surgical History   I have reviewed this patient's surgical history and updated it with pertinent information if needed.  Past Surgical History:   Procedure Laterality Date     ENT SURGERY         Social History   I have reviewed this [facility] social history and updated it with pertinent information if needed.  Social History     Tobacco Use     Smoking status: Never Smoker     Smokeless tobacco: Never Used   Substance Use Topics     Alcohol use: Never     Frequency: Never     Drug use: Never       Family History     No significant family history.    Prior to Admission Medications   None     Allergies   No Known Allergies    Physical Exam   Vital Signs: Temp: 97.9  F (36.6  C) Temp src: Oral BP: (!) 144/82 Pulse: 80   Resp: 16 SpO2: 100 % O2 Device: None (Room air)    Weight: 0 lbs 0 oz    GEN: well-developed, thin male, appears " comfortable  HEENT: NCAT, EOM intact bilaterally, sclera clear, conjunctiva normal, nose & mouth patent, mucous membranes moist  CHEST: lungs CTA bilaterally, no increased work of breathing, no wheeze, crackles, rhonchi  HEART: RRR, S1 & S2, no murmur  ABD: soft, nontender, nondistended, no guarding or rigidity, +BS in all 4 quadrants  MSK: AROM bilateral UE/LE, pedal & radial pulses 2+ bilaterally  NEURO: awake, alert. CN II-XII grossly intact. Sensation grossly intact to light touch.   SKIN: warm & dry without rash, no pedal edema    Data   Data reviewed today: I reviewed all medications, new labs and imaging results over the last 24 hours. I personally reviewed no images or EKG's today.    Recent Labs   Lab 09/11/20  0803 09/10/20  1206   WBC 4.7 4.5   HGB 9.4* 9.7*   MCV 87 88    229    136   POTASSIUM 4.5 4.7   CHLORIDE 108 106   CO2 14* 13*   * 165*   CR 15.20* 14.60*   ANIONGAP 17* 17*   HENOK 9.1 9.1   * 162*   ALBUMIN  --  4.4   PROTTOTAL  --  9.6*   BILITOTAL  --  0.4   ALKPHOS  --  66   ALT  --  16   AST  --  11     No results found for this or any previous visit (from the past 24 hour(s)).

## 2020-09-11 NOTE — CONSULTS
[facility]    RENAL CONSULTATION NOTE    REFERRING MD:  Maynor Israel PA-C    REASON FOR CONSULTATION:  ESKD, missed HD x 3 wks    DATE OF CONSULTATION: 09/11/20    SHORTHAND KEY FOR MY NOTES:  c = with, s = without, p = after, a = before, x = except, asx = asymptomatic, tx = transplant or treatment, sx = symptoms or symptomatic, cx = canceled or culture, rxn = reaction, yday = yesterday, nl = normal, abx = antibiotics, fxn = function, dx = diagnosis, dz = disease, m/h = melena/hematochezia, c/d/l/ha = cramping/dizziness/lightheadedness/headache, d/c = discharge or diarrhea/constipation, f/c/n/v = fevers/chills/nausea/vomiting, cp/sob = chest pain/shortness of breath, tbv = total body volume, rxn = reaction, tdc = tunneled dialysis catheter, pta = prior to admission, hd = hemodialysis, pd = peritoneal dialysis, hhd = home hemodialysis    HPI: Gemma Kessler is a 70 year old male c ESKD 2 DM2 / L nephrectomy who dialyses via a RIJ TDC at the [facility] under the care of Dr. Joy and was admitted on 9/11/2020 c c/o feeling ill and found to significant lab abnormalities.  Hx obtained from chart since  services were not available and pt doesn't speak much English.    Pt presented to the ER on 9/10 c B ear pain and labs were abn.  However, he left AMA bc he didn't want to wait.  He came back today bc he wasn't feeling well.  When they spoke to him, he said that he hadn't dialysed in 3 wks bc he doesn't like his unit.  He has tried to be transferred to no avail, so he decided not to go back.      He denies any pain and breathing is ok.    ROS:  Unable.    PMH:    Past Medical History:   Diagnosis Date     Chronic kidney disease      Chronic sinusitis      PSH:    Past Surgical History:   Procedure Laterality Date     ENT SURGERY       MEDICATIONS:    Reviewed    ALLERGIES:    Allergies as of 09/11/2020     (No Known Allergies)     FH:    No family history on file.    R/NC    SH:    Social History     Socioeconomic History     Marital status: Single     Spouse name: Not on file     Number of children: Not on file     Years of education: Not on file     Highest education level: Not on file   Occupational History     Not on file   Social Needs     Financial resource strain: Not on file     Food insecurity     Worry: Not on file     Inability: Not on file     Transportation needs     Medical: Not on file     Non-medical: Not on file   Tobacco Use     Smoking status: Never Smoker     Smokeless tobacco: Never Used   Substance and Sexual Activity     Alcohol use: Never     Frequency: Never     Drug use: Never     Sexual activity: Not on file   Lifestyle     Physical activity     Days per week: Not on file     Minutes per session: Not on file     Stress: Not on file   Relationships     Social connections     Talks on phone: Not on file     Gets together: Not on file     Attends Christianity service: Not on file     Active member of club or organization: Not on file     Attends meetings of clubs or organizations: Not on file     Relationship status: Not on file     Intimate partner violence     Fear of current or ex partner: Not on file     Emotionally abused: Not on file     Physically abused: Not on file     Forced sexual activity: Not on file   Other Topics Concern     Not on file   Social History Narrative     Not on file     PHYSICAL EXAM:    BP (!) 144/82 (BP Location: Right arm)   Pulse 80   Temp 97.9  F (36.6  C) (Oral)   Resp 16   SpO2 100%     GENERAL: awake, alert, NAD  HEENT:  normocephalic, no gross abnormalities; MMM, dentition is ok; pupils equal, EOMI, no scleral icterus or conj edema  CV: RRR c 1/6 m, no obv rub; no ble edema  RESP: CTA B c good efforts  GI: abd s/nd, intermittently tender RLQ; no masses  MUSCULOSKELETAL: extremities nl - no gross deformities noted  SKIN: no suspicious lesions or rashes, dry to touch  NEURO:  strength normal and symmetric  PSYCH: mood  good, affect appropriate  LYMPH: no palpable ant/post cervical and supraclavicular adenopathy  OTHER:  Access - RIJ TDC    LABS:      CBC RESULTS:     Recent Labs   Lab 09/11/20  0803 09/10/20  1206   WBC 4.7 4.5   RBC 3.15* 3.38*   HGB 9.4* 9.7*   HCT 27.5* 29.7*    229     BMP RESULTS:  Recent Labs   Lab 09/11/20  0803 09/10/20  1206    136   POTASSIUM 4.5 4.7   CHLORIDE 108 106   CO2 14* 13*   * 165*   CR 15.20* 14.60*   * 162*   HENOK 9.1 9.1     INRNo lab results found in last 7 days.     DIAGNOSTICS:  Personally reviewed ECG - NSR, no peaked Ts    A/P:  eGmma Kessler is a 70 year old male c ESKD who has missed 3 wks of dialysis and has acidosis and azotemia.    1.  ESKD.  Pt hasn't dialysed in a long time and the BUN/Cr are quite high.  He is not that volume up, likely bc he still makes urine.  We will run him today and tmrw c lower flow and shorter time to prevent dysequilibrium since the BUN is in the 160s.   A.  2.5h today, then 3h tmrw.  B.  Will need to find a new HD unit.  [address]?  [facility] goes there, too.    2.  Met acidosis.  This is due to the uremia.  It will correct c dialysis.  A.  Follow labs, clinically.    3.  HTN.  Pt's BP is slightly elevated.  A.  Follow BP, clinically.    4.  DM2.  Sugars are a little high.  A.  Per hospitalist team.    5.  FEN.  Electrolytes are ok.  A.  Dialysis diet.    Thank you for this consultation. We will follow c you.  Please call if any questions.    Attestation:   I have reviewed today's relevant vital signs, notes, medications, labs and imaging.    Maurice Martins MD  [facility] - Nephrology  [phone]

## 2020-09-11 NOTE — PROGRESS NOTES
Potassium   Date Value Ref Range Status   09/11/2020 4.5 3.4 - 5.3 mmol/L Final     Hemoglobin   Date Value Ref Range Status   09/11/2020 9.4 (L) 13.3 - 17.7 g/dL Final     Creatinine   Date Value Ref Range Status   09/11/2020 15.20 (H) 0.66 - 1.25 mg/dL Final     Urea Nitrogen   Date Value Ref Range Status   09/11/2020 160 (H) 7 - 30 mg/dL Final     Sodium   Date Value Ref Range Status   09/11/2020 139 133 - 144 mmol/L Final     No results found for: INR    DIALYSIS PROCEDURE NOTE  Hepatitis status of previous patient on machine log was checked and verified ok to use with this patients hepatitis status.  Patient dialyzed for 2.5 hrs. on a 3 K bath with a net fluid removal of  0.5L.  A BFR of 400 ml/min was obtained via a right tunneled catheter.  The treatment plan was discussed with Dr. Martins during the treatment.  Total heparin received during the treatment: 0 units.     Incapacitated Nurse education completed yes, Machine test alarm pass at 1430.  Line flushed, clamped and capped with heparin 1:1000 2.1 mL (2100 units) per lumen  Meds  given: none Complications: none  Catheter exit site cleaned and new dressing applied with a biopatch.  Exit site clean and dry, no redness or tenderness noted    Person educated patient, Knowledge base substantial,Barriers to learning language - only speaks and understands Ghanaian ,.   Skin Assessment pre run: cool and dry Pre run assessment of edema none noted  ICEBOAT? Timeout performed pre-treatment  I: Patient was identified using 2 identifiers  C: Consent obtained/verified current before treatment  E: Equipment preventative maintenance is current and dialysis delivery system OK to use  B: Hepatitis B Surface Antigen: negative; Draw Date: 1/24/20      Hepatitis B Surface Antibody: immune; Draw Date: 1/24/20  O: Dialysis orders present and complete prior to treatment  A: Vascular access verified and assessed prior to treatment  T: Treatment was performed at a clinically  appropriate time  ?: Patient was allowed to ask questions and address concerns prior to treatment  See flowsheet in EPIC for further details and post assessment.  Machine water alarm in place and functioning. Transducer pods intact and checked every 15min.  Skin post assessment:cool and dry Post Edema none present Pt dialyzed in his room  Report received from: Reynold AMARAL  Report given to: Reynold AMARAL  Chlorine/Chloramine water system checked every 4 hours.  Outpatient Dialysis at [facility] currently - wants to be moved to another facility

## 2020-09-11 NOTE — ED NOTES
Pt informed about transfer to [facility]. Agrees with plan. PCA- Warsan with pt but states that she will be going home and not with pt.

## 2020-09-11 NOTE — ED NOTES
[facility], [place]   ED Nurse to Floor Handoff     Gemma Kessler is a 70 year old male who speaks Spanish and lives alone,  in a home  They arrived in the ED by car from home    ED Chief Complaint: Abnormal Labs (Was seen here yesterday. Today presents to ED. )    ED Dx;   Final diagnoses:   Uremia   ESRD (end stage renal disease) on dialysis (H)         Needed?: Yes    Allergies: No Known Allergies.  Past Medical Hx:   Past Medical History:   Diagnosis Date     Chronic kidney disease      Chronic sinusitis       Baseline Mental status: WDL  Current Mental Status changes: at basesline    Infection present or suspected this encounter: no  Sepsis suspected: No  Isolation type: No active isolations     Activity level - Baseline/Home:  Independent  Activity Level - Current:   Independent    Bariatric equipment needed?: No    In the ED these meds were given:   Medications   ondansetron (ZOFRAN) injection 4 mg (4 mg Intravenous Given 9/11/20 0818)       Drips running?  No    Home pump  No    Current LDAs  Peripheral IV 09/11/20 Left (Active)   Site Assessment WDL 09/11/20 0806   Line Status Saline locked 09/11/20 0806   Phlebitis Scale 0-->no symptoms 09/11/20 0806   Infiltration Scale 0 09/11/20 0806   Extravasation? No 09/11/20 0806   Dressing Intervention New dressing  09/11/20 0806   Number of days: 0       Labs results:   Labs Ordered and Resulted from Time of ED Arrival Up to the Time of Departure from the ED   CBC WITH PLATELETS DIFFERENTIAL - Abnormal; Notable for the following components:       Result Value    RBC Count 3.15 (*)     Hemoglobin 9.4 (*)     Hematocrit 27.5 (*)     All other components within normal limits   BASIC METABOLIC PANEL - Abnormal; Notable for the following components:    Carbon Dioxide 14 (*)     Anion Gap 17 (*)     Glucose 105 (*)     Urea Nitrogen 160 (*)     Creatinine 15.20 (*)     GFR Estimate 3 (*)     GFR Estimate If Black 3 (*)     All  other components within normal limits   BLOOD GAS VENOUS - Abnormal; Notable for the following components:    Ph Venous 7.20 (*)     PCO2 Venous 32 (*)     Bicarbonate Venous 12 (*)     All other components within normal limits   UA MACROSCOPIC WITH REFLEX TO MICRO AND CULTURE       Imaging Studies: No results found for this or any previous visit (from the past 24 hour(s)).    Recent vital signs:   BP (!) 154/75   Pulse 76   Temp 96.5  F (35.8  C) (Oral)   Resp 16   Wt 69.5 kg (153 lb 3.2 oz)   SpO2 99%     Ridge Coma Scale Score: 15 (09/11/20 1052)       Cardiac Rhythm: Other  Pt needs tele? No  Skin/wound Issues: Very dry, itchy skin    Code Status: Full Code    Pain control: good    Nausea control: good    Abnormal labs/tests/findings requiring intervention: see EPIC    Family present during ED course? Yes   Family Comments/Social Situation comments: PCA with pt    Tasks needing completion: None    WYATT MAYA RN  [facility] -- [phone] [place] ED  [phone] [facility] ED

## 2020-09-11 NOTE — PLAN OF CARE
Summary:     DATE & TIME: 9/11/20 7-3pm  Cognitive Concerns/ Orientation : A&OX3, speaks somalian   BEHAVIOR & AGGRESSION TOOL COLOR: Green  CIWA SCORE: NA   ABNL VS/O2: Vss, on RA  MOBILITY: Sba  PAIN MANAGMENT: denies  DIET: Renal  BOWEL/BLADDER: Continent  ABNL LAB/BG: Creat 15.2   DRAIN/DEVICES: Right tunnel cath  TELEMETRY RHYTHM: NA  SKIN: NA  TESTS/PROCEDURES: Dialysis today  D/C DAY/GOALS/PLACE: Pending  OTHER IMPORTANT INFO:

## 2020-09-11 NOTE — ED PROVIDER NOTES
History     Chief Complaint   Patient presents with     Abnormal Labs     Was seen here yesterday. Today presents to ED.      HPI  Gemma Kessler is a 70 year old male with a past medical history significant for ESRD, DM2, HTN, renal cell carcinoma of left kidney s/p left nephrectomy, and hyperkalemia who presents here to the Emergency Department due to hearing loss and missed dialysis.  Patient denies vomiting, chest pain, shortness of breath, and abdominal pain.  Patient denies smoking.  Patient states he normally goes to [facility] and [facility]. He has not been doing dialysis because he does not like the center where he goes to get dialysis done. Patient states he is still making urine.  Patient endorses burning and pain with urination.  He was seen here yesterday and signed up for admission however he said he was waiting too long and so left.  He understands that as he comes in to see us today it will also take a long time to get a bed for him and states he will stay.  Denies any current fevers or chills.  He denies any shortness of breath, chest pain or abdominal pain.  He does note that he feels quite itchy    I have reviewed the Medications, Allergies, Past Medical and Surgical History, and Social History in the [facility] system.  PAST MEDICAL HISTORY:   Past Medical History:   Diagnosis Date     Chronic kidney disease      Chronic sinusitis        PAST SURGICAL HISTORY:   Past Surgical History:   Procedure Laterality Date     ENT SURGERY         Past medical history, past surgical history, medications, and allergies were reviewed with the patient. Additional pertinent items: None    FAMILY HISTORY: No family history on file.    SOCIAL HISTORY:   Social History     Tobacco Use     Smoking status: Never Smoker     Smokeless tobacco: Never Used   Substance Use Topics     Alcohol use: Never     Frequency: Never     Social history was reviewed with the patient. Additional pertinent items:  None      Patient's Medications    No medications on file        No Known Allergies     Review of Systems   Constitutional: Negative for fever.   Respiratory: Negative for shortness of breath.    Cardiovascular: Negative for chest pain.   Gastrointestinal: Negative for abdominal pain.   All other systems reviewed and are negative.    A complete review of systems was performed with pertinent positives and negatives noted in the HPI, and all other systems negative.       Physical Exam   BP: 134/74  Pulse: 90  Temp: 96.5  F (35.8  C)  Resp: 16  Weight: 69.5 kg (153 lb 3.2 oz)  SpO2: 100 %      Physical Exam  Constitutional:       General: He is not in acute distress.     Appearance: He is not diaphoretic.   HENT:      Head: Normocephalic.      Mouth/Throat:      Pharynx: No oropharyngeal exudate.   Eyes:      Extraocular Movements: Extraocular movements intact.   Neck:      Musculoskeletal: Neck supple.   Cardiovascular:      Heart sounds: Normal heart sounds.   Pulmonary:      Effort: No respiratory distress.      Breath sounds: Normal breath sounds.      Comments: Right upper chest tunneled dialysis catheter  Abdominal:      General: There is no distension.      Palpations: Abdomen is soft.      Tenderness: There is no abdominal tenderness.   Musculoskeletal:         General: No deformity.   Skin:     General: Skin is dry.   Neurological:      Mental Status: He is alert.      Comments: alert   Psychiatric:         Behavior: Behavior normal.         ED Course        Procedures           Results for orders placed or performed during the hospital encounter of 09/11/20 (from the past 24 hour(s))   CBC with platelets differential   Result Value Ref Range    WBC 4.7 4.0 - 11.0 10e9/L    RBC Count 3.15 (L) 4.4 - 5.9 10e12/L    Hemoglobin 9.4 (L) 13.3 - 17.7 g/dL    Hematocrit 27.5 (L) 40.0 - 53.0 %    MCV 87 78 - 100 fl    MCH 29.8 26.5 - 33.0 pg    MCHC 34.2 31.5 - 36.5 g/dL    RDW 14.6 10.0 - 15.0 %    Platelet Count 214  150 - 450 10e9/L    Diff Method Automated Method     % Neutrophils 62.0 %    % Lymphocytes 22.1 %    % Monocytes 10.2 %    % Eosinophils 5.1 %    % Basophils 0.4 %    % Immature Granulocytes 0.2 %    Nucleated RBCs 0 0 /100    Absolute Neutrophil 2.9 1.6 - 8.3 10e9/L    Absolute Lymphocytes 1.0 0.8 - 5.3 10e9/L    Absolute Monocytes 0.5 0.0 - 1.3 10e9/L    Absolute Eosinophils 0.2 0.0 - 0.7 10e9/L    Absolute Basophils 0.0 0.0 - 0.2 10e9/L    Abs Immature Granulocytes 0.0 0 - 0.4 10e9/L    Absolute Nucleated RBC 0.0    Basic metabolic panel   Result Value Ref Range    Sodium 139 133 - 144 mmol/L    Potassium 4.5 3.4 - 5.3 mmol/L    Chloride 108 94 - 109 mmol/L    Carbon Dioxide 14 (L) 20 - 32 mmol/L    Anion Gap 17 (H) 3 - 14 mmol/L    Glucose 105 (H) 70 - 99 mg/dL    Urea Nitrogen 160 (H) 7 - 30 mg/dL    Creatinine 15.20 (H) 0.66 - 1.25 mg/dL    GFR Estimate 3 (L) >60 mL/min/[1.73_m2]    GFR Estimate If Black 3 (L) >60 mL/min/[1.73_m2]    Calcium 9.1 8.5 - 10.1 mg/dL   Blood gas venous   Result Value Ref Range    Ph Venous 7.20 (L) 7.32 - 7.43 pH    PCO2 Venous 32 (L) 40 - 50 mm Hg    PO2 Venous 25 25 - 47 mm Hg    Bicarbonate Venous 12 (L) 21 - 28 mmol/L    Base Deficit Venous 14.6 mmol/L    FIO2 21      Medications   ondansetron (ZOFRAN) injection 4 mg (4 mg Intravenous Given 9/11/20 0818)      Results for orders placed or performed during the hospital encounter of 09/11/20   CBC with platelets differential     Status: Abnormal   Result Value Ref Range    WBC 4.7 4.0 - 11.0 10e9/L    RBC Count 3.15 (L) 4.4 - 5.9 10e12/L    Hemoglobin 9.4 (L) 13.3 - 17.7 g/dL    Hematocrit 27.5 (L) 40.0 - 53.0 %    MCV 87 78 - 100 fl    MCH 29.8 26.5 - 33.0 pg    MCHC 34.2 31.5 - 36.5 g/dL    RDW 14.6 10.0 - 15.0 %    Platelet Count 214 150 - 450 10e9/L    Diff Method Automated Method     % Neutrophils 62.0 %    % Lymphocytes 22.1 %    % Monocytes 10.2 %    % Eosinophils 5.1 %    % Basophils 0.4 %    % Immature Granulocytes 0.2 %     Nucleated RBCs 0 0 /100    Absolute Neutrophil 2.9 1.6 - 8.3 10e9/L    Absolute Lymphocytes 1.0 0.8 - 5.3 10e9/L    Absolute Monocytes 0.5 0.0 - 1.3 10e9/L    Absolute Eosinophils 0.2 0.0 - 0.7 10e9/L    Absolute Basophils 0.0 0.0 - 0.2 10e9/L    Abs Immature Granulocytes 0.0 0 - 0.4 10e9/L    Absolute Nucleated RBC 0.0    Basic metabolic panel     Status: Abnormal   Result Value Ref Range    Sodium 139 133 - 144 mmol/L    Potassium 4.5 3.4 - 5.3 mmol/L    Chloride 108 94 - 109 mmol/L    Carbon Dioxide 14 (L) 20 - 32 mmol/L    Anion Gap 17 (H) 3 - 14 mmol/L    Glucose 105 (H) 70 - 99 mg/dL    Urea Nitrogen 160 (H) 7 - 30 mg/dL    Creatinine 15.20 (H) 0.66 - 1.25 mg/dL    GFR Estimate 3 (L) >60 mL/min/[1.73_m2]    GFR Estimate If Black 3 (L) >60 mL/min/[1.73_m2]    Calcium 9.1 8.5 - 10.1 mg/dL   Blood gas venous     Status: Abnormal   Result Value Ref Range    Ph Venous 7.20 (L) 7.32 - 7.43 pH    PCO2 Venous 32 (L) 40 - 50 mm Hg    PO2 Venous 25 25 - 47 mm Hg    Bicarbonate Venous 12 (L) 21 - 28 mmol/L    Base Deficit Venous 14.6 mmol/L    FIO2 21             Assessments & Plan (with Medical Decision Making)   70-year-old male presents to us with a chief complaint of needing dialysis.  Labs do show significant uremia and patient is quite itchy as a result.  He does not want to go back to his normal dialysis clinic and dialysis Center.  We will admit the patient however the main [facility] hospital is quite full right now with no available beds.  I discussed care with Dr. Sesay of [facility] as well as 1 of their nephrologist.  They are willing to accept the patient for admission and transfer for dialysis and then to arrange a new plan for the patient to get outpatient dialysis.  The patient is agreeable to this plan.    I have reviewed the nursing notes.    I have reviewed the findings, diagnosis, plan and need for follow up with the patient.    New Prescriptions    No medications on file       Final  diagnoses:   Uremia   ESRD (end stage renal disease) on dialysis (H)       9/11/2020   [facility], [place], EMERGENCY DEPARTMENT     Anurag Odom DO  09/11/20 1059

## 2020-09-11 NOTE — PROGRESS NOTES
Care Coordinator was asked by Nephrologist to help in finding a different [facility] dialysis unit.  Pt last had dialysis at the [facility] unit ([phone]) on 8/3/20.  He does not like this unit.  Conversed with pt with the Sarah Manzo , (that was a little difficult).  Pt in agreement for transition to another [facility] unit.  Contacted the [facility] Dialysis Coordinator Domi Paulino ([phone]).  Unfortunately, the next closest unit to pt, [facility], gave away the last chair time this AM.  The next closest unit is [facility] ([phone]).    Domi updated writer the transfer request has been put in, but there is an admission process.  This takes at least a day or two and unfortunately being Friday afternoon, this will not be done until Monday at the earliest.  If this is able to be expedited, Domi will call the floor over the weekend.

## 2020-09-12 LAB
GLUCOSE BLDC GLUCOMTR-MCNC: 123 MG/DL (ref 70–99)
GLUCOSE BLDC GLUCOMTR-MCNC: 85 MG/DL (ref 70–99)
GLUCOSE BLDC GLUCOMTR-MCNC: 94 MG/DL (ref 70–99)

## 2020-09-12 PROCEDURE — 25000132 ZZH RX MED GY IP 250 OP 250 PS 637: Performed by: STUDENT IN AN ORGANIZED HEALTH CARE EDUCATION/TRAINING PROGRAM

## 2020-09-12 PROCEDURE — 00000146 ZZHCL STATISTIC GLUCOSE BY METER IP

## 2020-09-12 PROCEDURE — 99207 ZZC APP CREDIT; MD BILLING SHARED VISIT: CPT | Performed by: STUDENT IN AN ORGANIZED HEALTH CARE EDUCATION/TRAINING PROGRAM

## 2020-09-12 PROCEDURE — G0378 HOSPITAL OBSERVATION PER HR: HCPCS

## 2020-09-12 PROCEDURE — 25800030 ZZH RX IP 258 OP 636: Performed by: INTERNAL MEDICINE

## 2020-09-12 PROCEDURE — 25000128 H RX IP 250 OP 636: Performed by: INTERNAL MEDICINE

## 2020-09-12 PROCEDURE — 99225 ZZC SUBSEQUENT OBSERVATION CARE,LEVEL II: CPT | Performed by: PHYSICIAN ASSISTANT

## 2020-09-12 PROCEDURE — G0257 UNSCHED DIALYSIS ESRD PT HOS: HCPCS

## 2020-09-12 PROCEDURE — 90937 HEMODIALYSIS REPEATED EVAL: CPT

## 2020-09-12 RX ORDER — NICOTINE POLACRILEX 4 MG
15-30 LOZENGE BUCCAL
Status: DISCONTINUED | OUTPATIENT
Start: 2020-09-12 | End: 2020-09-15 | Stop reason: HOSPADM

## 2020-09-12 RX ORDER — DEXTROSE MONOHYDRATE 25 G/50ML
25-50 INJECTION, SOLUTION INTRAVENOUS
Status: DISCONTINUED | OUTPATIENT
Start: 2020-09-12 | End: 2020-09-15 | Stop reason: HOSPADM

## 2020-09-12 RX ORDER — ALBUMIN, HUMAN INJ 5% 5 %
250 SOLUTION INTRAVENOUS
Status: DISCONTINUED | OUTPATIENT
Start: 2020-09-12 | End: 2020-09-12

## 2020-09-12 RX ORDER — SIMVASTATIN 20 MG
20 TABLET ORAL AT BEDTIME
COMMUNITY

## 2020-09-12 RX ORDER — ALBUMIN (HUMAN) 12.5 G/50ML
50 SOLUTION INTRAVENOUS
Status: DISCONTINUED | OUTPATIENT
Start: 2020-09-12 | End: 2020-09-12

## 2020-09-12 RX ADMIN — HEPARIN SODIUM 2100 UNITS: 1000 INJECTION INTRAVENOUS; SUBCUTANEOUS at 14:27

## 2020-09-12 RX ADMIN — CALCIUM CARBONATE (ANTACID) CHEW TAB 500 MG 1000 MG: 500 CHEW TAB at 19:17

## 2020-09-12 RX ADMIN — SODIUM CHLORIDE 250 ML: 9 INJECTION, SOLUTION INTRAVENOUS at 14:25

## 2020-09-12 RX ADMIN — HEPARIN SODIUM 2100 UNITS: 1000 INJECTION INTRAVENOUS; SUBCUTANEOUS at 14:26

## 2020-09-12 RX ADMIN — CALCIUM CARBONATE (ANTACID) CHEW TAB 500 MG 1000 MG: 500 CHEW TAB at 00:01

## 2020-09-12 RX ADMIN — SODIUM CHLORIDE 200 ML: 9 INJECTION, SOLUTION INTRAVENOUS at 14:24

## 2020-09-12 NOTE — PROGRESS NOTES
"    [facility]    Medicine Progress Note - Hospitalist Service       Date of Admission:  9/11/2020    Assessment & Plan    Gemma Kessler is a 70 year old male with a past medical history of ESRD, diabetes mellitus type 2, renal cell carcinoma of the left kidney status post nephrectomy who presented to the emergency department at [facility] on 9/10/2020 with a chief complaint of bilateral otalgia, incidentally noting he had missed approximately 3 weeks of dialysis and felt ill.  Laboratory studies indicated a severe metabolic acidosis and need for urgent dialysis, patient initially left AMA due to wait times and then returned 9/11 with persistent generalized symptoms and requested admission for HD. Due to bed availability, pt was transferred to [facility] for treatment.    ESRD  Noncompliance  Metabolic acidosis, anion-gap  Previously arranged to undergo HD MWF, however patient describes \"issues\" with his dialysis center ([facility]). Unwilling to describe what said issues were, only indicates he requested transfer to an alternative facility which was not performed and therefore he decided to stop attending. Last HD run appears to be 8/26/20 per Care Everywhere. When asked if he had been in contact with his nephrologist ([facility]) regarding issues at HD center, pt does not answer. Labwork obtained in ED indicate uncompensated anion gap metabolic acidosis with , electrolytes surprisingly wnl.   - Nephrology consulted, is s/p HD on 9/11 with plans for additional run 9/12  - Care Coordinator/SW consult to assist with alternative HD location    Hx RCC of left kidney s/p nephrectomy  Per summaries via [facility], was on palliative chemo earlier this year. Prior scans indicated bladder wall thickening. Unclear if this was followed up as an outpatient. Appears to have been lost to follow-up with Oncology/Urology.     Diabetes " Mellitus Type II  Not on medications PTA. Last Hgb A1c is from 2019.  - Repeat Hgb A1c  - Accuchecks QID, add ssi if needed    Hypertension  - PTA amlodipine    Hx HD catheter-related MSSA bacteremia  Hx IVC septic thrombophlebitis  Admission both at [facility] and [facility] in 06/2020 (left AMA from [facility], presented to [facility] for definitive care). Suspected related to femoral HD catheter, findings of occlusive iliac and non-occlusive IVC thrombus presumed septic. Also complicated by septic lung abscesses. TTE & JUNIOR neg for vegetations. Completed 6-weeks of antibiotic tx, previously on warfarin tx for thrombus. No longer on chronic AC.    COVID rule-out  Asymptomatic PCR performed 9/10, NEGATIVE.       Diet: Renal Diet (non-dialysis)    DVT Prophylaxis: Pneumatic Compression Devices and Ambulate every shift  Sahu Catheter: not present  Code Status: Full Code           Disposition Plan   Expected discharge: Tomorrow, recommended to prior living arrangement once cleared by nephrology, outpatient HD established.  Entered: Maynor Israel PA-C 09/12/2020, 1:41 PM       The patient's care was discussed with the Attending Physician, Dr. Styles, Bedside Nurse and Patient.    Maynor Israel PA-C  Hospitalist Service  [facility]    ______________________________________________________________________    Interval History   Pt seen & evaluated. Resting in bed, no complaints. Planning repeat HD run today, care coordinator working on new HD site.    Data reviewed today: I reviewed all medications, new labs and imaging results over the last 24 hours. I personally reviewed no images or EKG's today.    Physical Exam   Vital Signs: Temp: 98.7  F (37.1  C) Temp src: Oral BP: 112/58 Pulse: 84   Resp: 15 SpO2: 98 % O2 Device: None (Room air)    Weight: 153 lbs 3.2 oz  GEN: well-developed, thin, appears comfortable  HEENT: NCAT, nose & mouth patent, mucous membranes moist  CHEST: lungs CTA bilaterally, no increased  work of breathing, no wheeze, crackles, rhonchi  HEART: RRR, S1 & S2, no murmur  ABD: soft, nontender, nondistended, no guarding or rigidity, +BS in all 4 quadrants  MSK: AROM bilateral UE/LE, pedal & radial pulses 2+ bilaterally  NEURO: sleepy, wakes to touch. CN II-XII grossly intact. Sensation grossly intact to light touch.   SKIN: warm & dry without rash, no pedal edema    Data   Recent Labs   Lab 09/11/20  0803 09/10/20  1206   WBC 4.7 4.5   HGB 9.4* 9.7*   MCV 87 88    229    136   POTASSIUM 4.5 4.7   CHLORIDE 108 106   CO2 14* 13*   * 165*   CR 15.20* 14.60*   ANIONGAP 17* 17*   HENOK 9.1 9.1   * 162*   ALBUMIN  --  4.4   PROTTOTAL  --  9.6*   BILITOTAL  --  0.4   ALKPHOS  --  66   ALT  --  16   AST  --  11     No results found for this or any previous visit (from the past 24 hour(s)).  Medications       sodium chloride 0.9%  250 mL Intravenous Once in dialysis     sodium chloride 0.9%  300 mL Hemodialysis Machine Once     heparin  3 mL Intracatheter During Hemodialysis (from stock)     heparin  3 mL Intracatheter During Hemodialysis (from stock)     - MEDICATION INSTRUCTIONS -   Does not apply Once

## 2020-09-12 NOTE — PROGRESS NOTES
Rn [id number]: Patient came back from dialysis, tolerated, 1/2 kilo taken off. Vss, denies pain. Spoke with patient through KATHARINA aly&TRAY2-3, said he was fine, had no questions. BGM 85, had dinner, no complaints.

## 2020-09-12 NOTE — PLAN OF CARE
Summary:     DATE & TIME: 9/12/20 7-3pm  Cognitive Concerns/ Orientation : A&OX3-4, speaks somalian/jabber in room  BEHAVIOR & AGGRESSION TOOL COLOR: Green  CIWA SCORE: NA   ABNL VS/O2: VSS on RA  MOBILITY: Sba  PAIN MANAGMENT: denies  DIET: Renal  BOWEL/BLADDER: Continent, on hemodialysis  ABNL LAB/BG: results pending   DRAIN/DEVICES: Right tunnel cath  TELEMETRY RHYTHM: NA  SKIN: NA  TESTS/PROCEDURES: Plan for dialysis again today  D/C DAY/GOALS/PLACE: Pending  OTHER IMPORTANT INFO: slept most of shift

## 2020-09-12 NOTE — PROGRESS NOTES
Renal Medicine Inpatient Dialysis Note                                Gemma Kessler MRN# [id number]   Age: 70 year old YOB: 1950   Date of Admission: 9/11/2020 Hospital LOS: 0          Assessment/Plan:     70 year old male c ESKD 2 DM2 / L nephrectomy admitted for dialysis via [facility].  Recent history of dialysis non compliance    1.  ESRD   -R IJ access   -[facility] (Dr. Joy)   -recent noncompliance  2.  Anemia  3.  Probable secondary hyperparathyroidism   4.  Metabolic acidosis   -will correct with dialysis  5.  HTN  6.  DM2    Next 09/14/20  Will need placement at a [facility] dialysis unit      Interval History:       Dialysis run parameters reviewed with dialysis RN at patient bedside    3 hours  300 BFR max  2K  1 liter UF    Stable initial portion of run      ROS     R: NEGATIVE for significant cough or SOB  CV: NEGATIVE for chest pain, palpitations    Dialysis Parameters:     Vitals were reviewed  Patient Vitals for the past 12 hrs:   BP Temp Temp src Pulse Resp SpO2   09/12/20 1345 128/74 -- -- 85 -- --   09/12/20 1330 112/58 -- -- 84 -- 98 %   09/12/20 1315 125/58 -- -- 84 -- --   09/12/20 1301 138/58 -- -- 84 -- --   09/12/20 1300 125/58 98.7  F (37.1  C) Oral 83 15 98 %   09/12/20 0741 127/69 97.8  F (36.6  C) Oral 86 16 99 %   09/12/20 0420 100/42 97.3  F (36.3  C) Axillary 81 15 100 %     I/O last 3 completed shifts:  In: 0   Out: 700 [Other:700]    Vitals:    09/11/20 1500   Weight: 69.5 kg (153 lb 3.2 oz)       Current Weight:  ?  Dry Weight: 68 range ?  Dialysis Temp: 36.5  C  Access Device: IJ  Access Site: right  Dialyzer: Revaclear  Dialysis Bath: 2   Sodium Profile: n  UF Goal: 1 to 2  Blood Flow Rate (mL/min): 300  Total Treatment Time (hrs): 3  Heparin: Low dose as required      EPO dose: n  Zemplar: n  IV Fe:       Medications and Allergies:     Reviewed      Physical Exam:     Seen and examined during course of dialysis run    /74   Pulse 85   Temp 98.7   F (37.1  C) (Oral)   Resp 15   Wt 69.5 kg (153 lb 3.2 oz)   SpO2 98%     GENERAL: awake, alert, follows  HEENT: NC/AT, PERRLA, EOMI, non icteric, pharynx moist without lesion  RESP: clear  CV: RRR, normal S1 S2  MS: no edema  SKIN: catheter site clean without drainage    Data:       Recent Labs   Lab 09/11/20  0803      POTASSIUM 4.5   CHLORIDE 108   CO2 14*   ANIONGAP 17*   *   *   CR 15.20*   GFRESTIMATED 3*   GFRESTBLACK 3*   HENOK 9.1         G Roderick Nuno MD    [facility] - Nephrology  [phone]

## 2020-09-12 NOTE — PROGRESS NOTES
Potassium   Date Value Ref Range Status   09/11/2020 4.5 3.4 - 5.3 mmol/L Final     Hemoglobin   Date Value Ref Range Status   09/11/2020 9.4 (L) 13.3 - 17.7 g/dL Final     Creatinine   Date Value Ref Range Status   09/11/2020 15.20 (H) 0.66 - 1.25 mg/dL Final     Urea Nitrogen   Date Value Ref Range Status   09/11/2020 160 (H) 7 - 30 mg/dL Final     Sodium   Date Value Ref Range Status   09/11/2020 139 133 - 144 mmol/L Final     No results found for: INR    DIALYSIS PROCEDURE NOTE  Hepatitis status of previous patient on machine log was checked and verified ok to use with this patients hepatitis status.  Patient dialyzed for 3 hrs. on a 3 K bath with a net fluid removal of  0.5L.  A BFR of 300 ml/min was obtained via a RIJ.   The treatment plan was discussed with Dr. Urbina during the treatment.  Total heparin received during the treatment: 0 units.   Incapacitated Nurse education completed yes, Machine test alarm pass at 1225.  Line flushed, clamped and capped with heparin 1:1000 2.1 mL (2100 units) per lumen  Meds  given: NONE Complications: NONE    Person educated pt, Knowledge base miminal,Barriers to learning language [place] , Educated on access mode Verbel, patient  Pt needs followup due to language barrier.  Skin Assessment pre run: warm/dry intact Pre run assessment of edema generalized  ICEBOAT? Timeout performed pre-treatment  I: Patient was identified using 2 identifiers  C: Consent obtained/verified current before treatment  E: Equipment preventative maintenance is current and dialysis delivery system OK to use  B: Hepatitis B Surface Antigen: neg; Draw Date: 7/20/2020      Hepatitis B Surface Antibody: Immune; Draw Date: 1/24/2020  O: Dialysis orders present and complete prior to treatment  A: Vascular access verified and assessed prior to treatment  T: Treatment was performed at a clinically appropriate time  ?: Patient was allowed to ask questions and address concerns prior to treatment  See  flowsheet in EPIC for further details and post assessment.  Machine water alarm in place and functioning. Transducer pods intact and checked every 15min.  Skin post assessment: warm/dry intact Post Edema generalized.  Pt returned via Wheelchair  Report received from: LORELEI Pineda R.N.  Report given to: LORELEI Pineda R.N.  Chlorine/Chloramine water system checked every 4 hours.  [facility] MWF

## 2020-09-12 NOTE — PLAN OF CARE
DATE & TIME: 9/11/20 1900-0730  Cognitive Concerns/ Orientation : A&OX3-4, speaks somalian/jabber in room  BEHAVIOR & AGGRESSION TOOL COLOR: Green  CIWA SCORE: NA   ABNL VS/O2: VSS on RA  MOBILITY: Sba  PAIN MANAGMENT: denies  DIET: Renal  BOWEL/BLADDER: Continent, on hemodialysis, C/o heartburn, with relief from TUMS  ABNL LAB/BG: Creat 15.2   DRAIN/DEVICES: Right tunnel cath  TELEMETRY RHYTHM: NA  SKIN: NA  TESTS/PROCEDURES: Plan for dialysis again today  D/C DAY/GOALS/PLACE: Pending  OTHER IMPORTANT INFO:

## 2020-09-12 NOTE — PROGRESS NOTES
Patient had dialysis today, vss, tolerated diet. Will have dialysis again 9/12/20. Jabber used for communication with patient.

## 2020-09-12 NOTE — PHARMACY-ADMISSION MEDICATION HISTORY
Pharmacy Medication History  Admission medication history interview status for the 9/11/2020  admission is complete. See EPIC admission navigator for prior to admission medications     Medication history sources: Surescripts, Pharmacy ([facility]) and [facility]   Medication history source reliability: Poor  Adherence assessment:     Significant changes made to the medication list:  -Called [facility] and [facility] pharmacy and they don't have warfarin listed. Per [facility] pharmacy pt is not compliant with his meds. It appears he was discharged on warfarin back in June from [facility]. Warfarin was not listed in discharge note from [facility] on 8/20. Pt did not know his meds. Please refer to note from [facility] in June. Warfarin was for Occlusive iliac and non-occulsive IVC thrombus; it appears new start. We are unable to verify if he has been on it or not.        Additional medication history information:       Medication reconciliation completed by provider prior to medication history? No    Time spent in this activity: 45 min      Prior to Admission medications    Medication Sig Last Dose Taking? Auth Provider   acetaminophen (TYLENOL) 500 MG tablet Take 500-1,000 mg by mouth every 8 hours as needed for mild pain  Yes Unknown, Entered By History   amLODIPine (NORVASC) 10 MG tablet Take 10 mg by mouth daily  Yes Unknown, Entered By History   calcium acetate (PHOSLO) 667 MG CAPS capsule Take 667 mg by mouth 3 times daily (with meals)  Yes Unknown, Entered By History   cetirizine (ZYRTEC) 10 MG tablet Take 10 mg by mouth daily  Yes Unknown, Entered By History   fluticasone (FLONASE) 50 MCG/ACT nasal spray Spray 1 spray into both nostrils daily  Yes Unknown, Entered By History   hydrochlorothiazide (HYDRODIURIL) 25 MG tablet Take 25 mg by mouth daily  Yes Unknown, Entered By History   nitroGLYcerin (NITROSTAT) 0.4 MG sublingual tablet Place 0.4 mg under the tongue every 5 minutes as needed for chest pain For chest pain  place 1 tablet under the tongue every 5 minutes for 3 doses. If symptoms persist 5 minutes after 1st dose call 911.  Yes Unknown, Entered By History   simvastatin (ZOCOR) 20 MG tablet Take 20 mg by mouth At Bedtime  Yes Unknown, Entered By History   tamsulosin (FLOMAX) 0.4 MG capsule Take 0.4 mg by mouth daily  Yes Unknown, Entered By History   WARFARIN SODIUM PO MD: RADHA for your information. Unable to verify if he takes warfarin or not.   Unknown, Entered By History

## 2020-09-13 ENCOUNTER — APPOINTMENT (OUTPATIENT)
Dept: INTERPRETER SERVICES | Facility: CLINIC | Age: 70
End: 2020-09-13
Payer: COMMERCIAL

## 2020-09-13 LAB
ALBUMIN SERPL-MCNC: 3.8 G/DL (ref 3.4–5)
ALP SERPL-CCNC: 51 U/L (ref 40–150)
ALT SERPL W P-5'-P-CCNC: 16 U/L (ref 0–70)
ANION GAP SERPL CALCULATED.3IONS-SCNC: 8 MMOL/L (ref 3–14)
AST SERPL W P-5'-P-CCNC: 16 U/L (ref 0–45)
BILIRUB SERPL-MCNC: 0.5 MG/DL (ref 0.2–1.3)
BUN SERPL-MCNC: 58 MG/DL (ref 7–30)
CALCIUM SERPL-MCNC: 8.6 MG/DL (ref 8.5–10.1)
CHLORIDE SERPL-SCNC: 99 MMOL/L (ref 94–109)
CO2 SERPL-SCNC: 26 MMOL/L (ref 20–32)
CREAT SERPL-MCNC: 8.35 MG/DL (ref 0.66–1.25)
ERYTHROCYTE [DISTWIDTH] IN BLOOD BY AUTOMATED COUNT: 14.4 % (ref 10–15)
GFR SERPL CREATININE-BSD FRML MDRD: 6 ML/MIN/{1.73_M2}
GLUCOSE BLDC GLUCOMTR-MCNC: 104 MG/DL (ref 70–99)
GLUCOSE BLDC GLUCOMTR-MCNC: 108 MG/DL (ref 70–99)
GLUCOSE BLDC GLUCOMTR-MCNC: 123 MG/DL (ref 70–99)
GLUCOSE SERPL-MCNC: 117 MG/DL (ref 70–99)
HBA1C MFR BLD: 5.5 % (ref 0–5.6)
HCT VFR BLD AUTO: 28.5 % (ref 40–53)
HGB BLD-MCNC: 9.7 G/DL (ref 13.3–17.7)
MCH RBC QN AUTO: 29.3 PG (ref 26.5–33)
MCHC RBC AUTO-ENTMCNC: 34 G/DL (ref 31.5–36.5)
MCV RBC AUTO: 86 FL (ref 78–100)
PLATELET # BLD AUTO: 157 10E9/L (ref 150–450)
POTASSIUM SERPL-SCNC: 3.9 MMOL/L (ref 3.4–5.3)
PROT SERPL-MCNC: 8.3 G/DL (ref 6.8–8.8)
RBC # BLD AUTO: 3.31 10E12/L (ref 4.4–5.9)
SODIUM SERPL-SCNC: 133 MMOL/L (ref 133–144)
WBC # BLD AUTO: 4.2 10E9/L (ref 4–11)

## 2020-09-13 PROCEDURE — 25000132 ZZH RX MED GY IP 250 OP 250 PS 637: Performed by: PHYSICIAN ASSISTANT

## 2020-09-13 PROCEDURE — 85027 COMPLETE CBC AUTOMATED: CPT | Performed by: INTERNAL MEDICINE

## 2020-09-13 PROCEDURE — 99225 ZZC SUBSEQUENT OBSERVATION CARE,LEVEL II: CPT | Performed by: PHYSICIAN ASSISTANT

## 2020-09-13 PROCEDURE — 36415 COLL VENOUS BLD VENIPUNCTURE: CPT | Performed by: INTERNAL MEDICINE

## 2020-09-13 PROCEDURE — 83036 HEMOGLOBIN GLYCOSYLATED A1C: CPT | Performed by: INTERNAL MEDICINE

## 2020-09-13 PROCEDURE — G0378 HOSPITAL OBSERVATION PER HR: HCPCS

## 2020-09-13 PROCEDURE — 80053 COMPREHEN METABOLIC PANEL: CPT | Performed by: PHYSICIAN ASSISTANT

## 2020-09-13 PROCEDURE — 25000128 H RX IP 250 OP 636: Performed by: PHYSICIAN ASSISTANT

## 2020-09-13 PROCEDURE — 36415 COLL VENOUS BLD VENIPUNCTURE: CPT | Performed by: PHYSICIAN ASSISTANT

## 2020-09-13 PROCEDURE — 00000146 ZZHCL STATISTIC GLUCOSE BY METER IP

## 2020-09-13 RX ORDER — CALCIUM ACETATE 667 MG/1
667 CAPSULE ORAL
Status: DISCONTINUED | OUTPATIENT
Start: 2020-09-13 | End: 2020-09-15 | Stop reason: HOSPADM

## 2020-09-13 RX ORDER — SIMVASTATIN 20 MG
20 TABLET ORAL AT BEDTIME
Status: DISCONTINUED | OUTPATIENT
Start: 2020-09-13 | End: 2020-09-15 | Stop reason: HOSPADM

## 2020-09-13 RX ADMIN — ONDANSETRON 4 MG: 4 TABLET, ORALLY DISINTEGRATING ORAL at 23:57

## 2020-09-13 RX ADMIN — Medication 1 MG: at 23:57

## 2020-09-13 RX ADMIN — CALCIUM ACETATE 667 MG: 667 CAPSULE ORAL at 17:37

## 2020-09-13 RX ADMIN — CALCIUM ACETATE 667 MG: 667 CAPSULE ORAL at 09:45

## 2020-09-13 RX ADMIN — SIMVASTATIN 20 MG: 20 TABLET, FILM COATED ORAL at 21:05

## 2020-09-13 RX ADMIN — CALCIUM ACETATE 667 MG: 667 CAPSULE ORAL at 12:21

## 2020-09-13 NOTE — PROGRESS NOTES
"    [facility]    Medicine Progress Note - Hospitalist Service       Date of Admission:  9/11/2020    Assessment & Plan    Gemma Kessler is a 70 year old male with a past medical history of ESRD, diabetes mellitus type 2, renal cell carcinoma of the left kidney status post nephrectomy who presented to the emergency department at [facility] on 9/10/2020 with a chief complaint of bilateral otalgia, incidentally noting he had missed approximately 3 weeks of dialysis and felt ill.  Laboratory studies indicated a severe metabolic acidosis and need for urgent dialysis, patient initially left AMA due to wait times and then returned 9/11 with persistent generalized symptoms and requested admission for HD. Due to bed availability, pt was transferred to [facility] for treatment.    ESRD with noncompliance of HD  Metabolic acidosis, anion-gap, resolved   Previously arranged to undergo HD MWF, however patient describes \"issues\" with his dialysis center ([facility]). Unwilling to describe what said issues were, only indicates he requested transfer to an alternative facility which was not performed and therefore he decided to stop attending. Last HD run appears to be 8/26/20 per Care Everywhere. When asked if he had been in contact with his nephrologist ([facility]) regarding issues at HD center, pt does not answer. Labwork obtained in ED indicate uncompensated anion gap metabolic acidosis with , electrolytes surprisingly wnl.   - Nephrology consulted, is s/p HD on 9/11 and 9/12, plan to run again 9/14 per home MWF schedule  - Care Coordinator/SW consult to assist with alternative HD location, likely will not be able to place until Monday 9/14    Vomiting  Patient reporting AM of 9/13 he had emesisx2 after eating. Denies abdominal pain, cp, sob, diarrhea/constipation. Declined labs yesterday, discussed importance of monitoring electrolytes in " context of reported vomiting. Afebrile without evidence of sepsis. Abdominal exam benign, no concern of peritonitis. Etiology unclear.  - ADAT  - CMP, CBC today    Hx RCC of left kidney s/p nephrectomy  Per summaries via [facility], was on palliative chemo earlier this year. Prior scans indicated bladder wall thickening. Unclear if this was followed up as an outpatient. Appears to have been lost to follow-up with Oncology/Urology.   - Oncology/Urology follow-up recommended at discharge    Diabetes Mellitus Type II  Not on medications PTA. Last Hgb A1c is from 2019.  - Repeat Hgb A1c  - Accuchecks QID, add ssi if needed    Hypertension  - PTA amlodipine    Hx HD catheter-related MSSA bacteremia  Hx IVC septic thrombophlebitis  Admission both at [facility] and [facility] in 06/2020 (left AMA from [facility], presented to [facility] for definitive care). Suspected related to femoral HD catheter, findings of occlusive iliac and non-occlusive IVC thrombus presumed septic. Also complicated by septic lung abscesses. TTE & JUNIOR neg for vegetations. Completed 6-weeks of antibiotic tx, previously on warfarin tx for thrombus. No longer on chronic AC - unclear how long patient was compliant with this medication.    COVID rule-out  Asymptomatic PCR performed 9/10, NEGATIVE.       Diet: Renal Diet (non-dialysis)  Room Service    DVT Prophylaxis: Low Risk/Ambulatory with no VTE prophylaxis indicated  Sahu Catheter: not present  Code Status: Full Code           Disposition Plan   Expected discharge: Tomorrow, recommended to prior living arrangement once dialysis center established.  Entered: Maynor Israel PA-C 09/13/2020, 12:30 PM       The patient's care was discussed with the Attending Physician, Dr. Styles, Bedside Nurse, Care Coordinator/ and Patient.    Maynor Israel PA-C  Hospitalist Service  [facility]    ______________________________________________________________________    Interval  "History   Pt seen & evaluated. Discussed POC via Jabber. Reports he is vomiting after eating, new symptom today. Has not informed anyone else of this. Pt notably declined all labs yesterday, stating \"you've already poked me 10x.\" Discussed importance of obtaining labs on daily basis given his renal dysfunction and reports of vomiting. Pt agreeable today. Denies abdominal pain, diarrhea. No cp, sob.     Data reviewed today: I reviewed all medications, new labs and imaging results over the last 24 hours. I personally reviewed no images or EKG's today.    Physical Exam   Vital Signs: Temp: 97.6  F (36.4  C) Temp src: Oral BP: 111/62 Pulse: 88   Resp: 16 SpO2: 100 % O2 Device: None (Room air)    Weight: 153 lbs 3.2 oz  GEN: well-developed, well-nourished, appears comfortable  HEENT: NCAT, EOM intact bilaterally, sclera clear, conjunctiva normal, nose & mouth patent, mucous membranes moist  CHEST: lungs CTA bilaterally, no increased work of breathing, no wheeze, crackles, rhonchi  HEART: RRR, S1 & S2, no murmur  ABD: soft, nontender, nondistended, no guarding or rigidity, +BS in all 4 quadrants  MSK: AROM bilateral UE/LE, pedal & radial pulses 2+ bilaterally  NEURO: awake, alert. CN II-XII grossly intact. Sensation grossly intact to light touch.   SKIN: warm & dry without rash, no pedal edema    Data   Recent Labs   Lab 09/13/20  1028 09/11/20  0803 09/10/20  1206   WBC 4.2 4.7 4.5   HGB 9.7* 9.4* 9.7*   MCV 86 87 88    214 229    139 136   POTASSIUM 3.9 4.5 4.7   CHLORIDE 99 108 106   CO2 26 14* 13*   BUN 58* 160* 165*   CR 8.35* 15.20* 14.60*   ANIONGAP 8 17* 17*   HENOK 8.6 9.1 9.1   * 105* 162*   ALBUMIN 3.8  --  4.4   PROTTOTAL 8.3  --  9.6*   BILITOTAL 0.5  --  0.4   ALKPHOS 51  --  66   ALT 16  --  16   AST 16  --  11     No results found for this or any previous visit (from the past 24 hour(s)).  Medications       calcium acetate  667 mg Oral TID w/meals     simvastatin  20 mg Oral At Bedtime "

## 2020-09-13 NOTE — UTILIZATION REVIEW
"Admission Status; Secondary Review Determination     Admission Date: 9/11/2020  1:01 PM       Under the authority of the Utilization Management Committee, the utilization review process indicated a secondary review on the above patient.  The review outcome is based on review of the medical records, discussions with staff, and applying clinical experience noted on the date of the review.          (x) Observation Status Appropriate - This patient does not meet hospital inpatient criteria and is placed in observation status. If this patient's primary payer is Medicare and was admitted as an inpatient, Condition Code 44 should be used and patient status changed to \"observation\".       RATIONALE FOR DETERMINATION      Brief clinical presentation, information copied from the chart, abbreviated and edited for relevant content:     Gemma Kessler is a 70 year old male with a past medical history of ESRD, diabetes mellitus type 2, renal cell carcinoma of the left kidney status post nephrectomy who presented to the emergency department at [facility] on 9/10/2020 with a chief complaint of bilateral otalgia, incidentally noting he had missed approximately 3 weeks of dialysis and felt ill.  Laboratory studies indicated a severe metabolic acidosis and need for urgent dialysis, patient initially left AMA due to wait times and then returned 9/11 with persistent generalized symptoms and requested admission for HD. Due to bed availability, pt was transferred to [facility] for treatment. Planning to discharge tomorrow.        Patient is clinically improving and there is no clear indication to change the patient's status to inpatient. The severity of illness, intensity of cares provided, risk for adverse outcome, and expected LOS make the care appropriate for observation.       The information on this document is developed by the utilization review team in order for the business office to ensure " compliance.  This only denotes the appropriateness of proper admission status and does not reflect the quality of care rendered.         The definitions of Inpatient Status and Observation Status used in making the determination above are those provided in the CMS Coverage Manual, Chapter 1 and Chapter 6, section 70.4.      Sincerely,     Judy Del Valle MD   Utilization Review/ Case Management  [facility].

## 2020-09-13 NOTE — PLAN OF CARE
DATE & TIME: 9/13/20 7-3pm  Cognitive Concerns/ Orientation : A&OX3-4, speaks somalian/jabber in room  BEHAVIOR & AGGRESSION TOOL COLOR: Green  CIWA SCORE: NA   ABNL VS/O2: VSS on RA  MOBILITY: Sba  PAIN MANAGMENT: denies  DIET: Renal  BOWEL/BLADDER: Continent, on hemodialysis  ABNL LAB/BG: creat 8.35 BUN 58  DRAIN/DEVICES: Right tunnel cath  TELEMETRY RHYTHM: NA  SKIN: NA  TESTS/PROCEDURES: will have dialysis on Monday  D/C DAY/GOALS/PLACE: Pending, need to establish where patient will dialyze upon discharge. CC consulted

## 2020-09-13 NOTE — PLAN OF CARE
DATE & TIME: 9/12/20 1900-0730  Cognitive Concerns/ Orientation : A&OX3-4, speaks somalian/jabber in room  BEHAVIOR & AGGRESSION TOOL COLOR: Green  CIWA SCORE: NA   ABNL VS/O2: VSS on RA  MOBILITY: Sba  PAIN MANAGMENT: denies  DIET: Renal  BOWEL/BLADDER: Continent, on hemodialysis  ABNL LAB/BG: results pending   DRAIN/DEVICES: Right tunnel cath  TELEMETRY RHYTHM: NA  SKIN: NA  TESTS/PROCEDURES: Dialyzed yesterday  D/C DAY/GOALS/PLACE: Pending, need to establish where patient will dialyze upon discharge. CC consulted  OTHER IMPORTANT INFO:

## 2020-09-14 LAB
ANION GAP SERPL CALCULATED.3IONS-SCNC: 11 MMOL/L (ref 3–14)
BUN SERPL-MCNC: 83 MG/DL (ref 7–30)
CALCIUM SERPL-MCNC: 8.7 MG/DL (ref 8.5–10.1)
CHLORIDE SERPL-SCNC: 96 MMOL/L (ref 94–109)
CO2 SERPL-SCNC: 23 MMOL/L (ref 20–32)
CREAT SERPL-MCNC: 9.69 MG/DL (ref 0.66–1.25)
FERRITIN SERPL-MCNC: 1042 NG/ML (ref 26–388)
GFR SERPL CREATININE-BSD FRML MDRD: 5 ML/MIN/{1.73_M2}
GLUCOSE BLDC GLUCOMTR-MCNC: 98 MG/DL (ref 70–99)
GLUCOSE SERPL-MCNC: 133 MG/DL (ref 70–99)
IRON SATN MFR SERPL: 30 % (ref 15–46)
IRON SERPL-MCNC: 72 UG/DL (ref 35–180)
POTASSIUM SERPL-SCNC: 3.6 MMOL/L (ref 3.4–5.3)
SODIUM SERPL-SCNC: 130 MMOL/L (ref 133–144)
TIBC SERPL-MCNC: 241 UG/DL (ref 240–430)

## 2020-09-14 PROCEDURE — 25800030 ZZH RX IP 258 OP 636: Performed by: INTERNAL MEDICINE

## 2020-09-14 PROCEDURE — 00000146 ZZHCL STATISTIC GLUCOSE BY METER IP

## 2020-09-14 PROCEDURE — 80048 BASIC METABOLIC PNL TOTAL CA: CPT | Performed by: INTERNAL MEDICINE

## 2020-09-14 PROCEDURE — G0378 HOSPITAL OBSERVATION PER HR: HCPCS

## 2020-09-14 PROCEDURE — 82728 ASSAY OF FERRITIN: CPT | Performed by: INTERNAL MEDICINE

## 2020-09-14 PROCEDURE — 25000132 ZZH RX MED GY IP 250 OP 250 PS 637: Performed by: PHYSICIAN ASSISTANT

## 2020-09-14 PROCEDURE — 99225 ZZC SUBSEQUENT OBSERVATION CARE,LEVEL II: CPT | Performed by: PHYSICIAN ASSISTANT

## 2020-09-14 PROCEDURE — 90937 HEMODIALYSIS REPEATED EVAL: CPT

## 2020-09-14 PROCEDURE — 83540 ASSAY OF IRON: CPT | Performed by: INTERNAL MEDICINE

## 2020-09-14 PROCEDURE — 83550 IRON BINDING TEST: CPT | Performed by: INTERNAL MEDICINE

## 2020-09-14 PROCEDURE — 36415 COLL VENOUS BLD VENIPUNCTURE: CPT | Performed by: INTERNAL MEDICINE

## 2020-09-14 PROCEDURE — G0257 UNSCHED DIALYSIS ESRD PT HOS: HCPCS

## 2020-09-14 PROCEDURE — 99207 ZZC CDG-MDM COMPONENT: MEETS MODERATE - UP CODED: CPT | Performed by: PHYSICIAN ASSISTANT

## 2020-09-14 RX ORDER — ALBUMIN (HUMAN) 12.5 G/50ML
50 SOLUTION INTRAVENOUS
Status: DISCONTINUED | OUTPATIENT
Start: 2020-09-14 | End: 2020-09-14

## 2020-09-14 RX ORDER — ALBUMIN, HUMAN INJ 5% 5 %
250 SOLUTION INTRAVENOUS
Status: DISCONTINUED | OUTPATIENT
Start: 2020-09-14 | End: 2020-09-14

## 2020-09-14 RX ADMIN — CALCIUM ACETATE 667 MG: 667 CAPSULE ORAL at 11:51

## 2020-09-14 RX ADMIN — SODIUM CHLORIDE 300 ML: 9 INJECTION, SOLUTION INTRAVENOUS at 15:28

## 2020-09-14 RX ADMIN — CALCIUM ACETATE 667 MG: 667 CAPSULE ORAL at 08:22

## 2020-09-14 RX ADMIN — CALCIUM ACETATE 667 MG: 667 CAPSULE ORAL at 17:07

## 2020-09-14 RX ADMIN — SODIUM CHLORIDE 250 ML: 9 INJECTION, SOLUTION INTRAVENOUS at 15:28

## 2020-09-14 NOTE — PROGRESS NOTES
Potassium   Date Value Ref Range Status   09/14/2020 3.6 3.4 - 5.3 mmol/L Final     Hemoglobin   Date Value Ref Range Status   09/13/2020 9.7 (L) 13.3 - 17.7 g/dL Final     Creatinine   Date Value Ref Range Status   09/14/2020 9.69 (H) 0.66 - 1.25 mg/dL Final     Urea Nitrogen   Date Value Ref Range Status   09/14/2020 83 (H) 7 - 30 mg/dL Final     Sodium   Date Value Ref Range Status   09/14/2020 130 (L) 133 - 144 mmol/L Final     No results found for: INR    DIALYSIS PROCEDURE NOTE  Hepatitis status of previous patient on machine log was checked and verified ok to use with this patients hepatitis status.  Patient dialyzed for 3.5 hrs. on a K3 bath with a net fluid removal of  1L.  A BFR of 400 ml/min was obtained via a CVC.  The treatment plan was discussed with Dr. Wilcox during the treatment.  Total heparin received during the treatment:  none.   Incapacitated Nurse education completed, Machine test alarm pass [id number].  Line flushed, clamped and capped with heparin 1:1000 1.6 mL  per lumen  Meds  given: no   Complications: none    Person educated: patient, Knowledge base: substantial,Barriers to learning: non-english speaking , Educated on procedure, mode: orally, patient verbalized/demonstrated understanding. Pt prefers verbal education style.   Skin Assessment pre run: warm, dry,    Pre run assessment of edema: None  ICEBOAT? Timeout performed pre-treatment  I: Patient was identified using 2 identifiers  C: Consent obtained/verified current before treatment  E: Equipment preventative maintenance is current and dialysis delivery system OK to use  B: Hepatitis B Surface Antigen: negative; Draw Date: 1/24/20      Hepatitis B Surface Antibody: Immune; Draw Date: 1/24/20  O: Dialysis orders present and complete prior to treatment  A: Vascular access verified and assessed prior to treatment  T: Treatment was performed at a clinically appropriate time  ?: Patient was allowed to ask questions and address concerns prior  to treatment  See flowsheet in EPIC for further details and post assessment.  Machine water alarm in place and functioning. Transducer pods intact and checked every 15min.  Skin post assessment: warm, dry   Post Edema: none  Pt returned via wheelchair.  Report received from: Teagan POSADAS RN  Report given to: Teagan POSADAS RN  Chlorine/Chloramine water system checked every 4 hours.  Outpatient Dialysis at: [facility]

## 2020-09-14 NOTE — PLAN OF CARE
DATE & TIME: 9/14/20 8819-5402  Cognitive Concerns/ Orientation : A&OX4, speaks somalian/jabber in room  BEHAVIOR & AGGRESSION TOOL COLOR: Green  CIWA SCORE: N/A   ABNL VS/O2: VSS on RA  MOBILITY: SBA  PAIN MANAGMENT: denies  DIET: Renal  BOWEL/BLADDER: Continent, on hemodialysis  ABNL LAB/BG: creat 8.35, BUN 58, BG 98  DRAIN/DEVICES: Right tunnel cath, R PIV SL  TELEMETRY RHYTHM: NA  SKIN: WDL  TESTS/PROCEDURES: will have dialysis today  D/C DAY/GOALS/PLACE: Pending, possibly today after dialysis; need to establish where patient will dialyze upon discharge. CC consulted.  OTHER IMPORTANT INFO: occasional pain in R PIV with wrist movement, applied heat packs, helped. Complained of nausea at start of shift, improved with zofran    MD/RN ROUNDING SIGNED OFF D/E SHIFT: n/a  COMMIT TO SIT DONE AND SIGNED OFF yes

## 2020-09-14 NOTE — PROGRESS NOTES
"    [facility]    Medicine Progress Note - Hospitalist Service       Date of Admission:  9/11/2020  Assessment & Plan      Gemma Kessler is a 70 year old male with a past medical history of ESRD, diabetes mellitus type 2, renal cell carcinoma of the left kidney status post nephrectomy who presented to the emergency department at [facility] on 9/10/2020 with a chief complaint of bilateral otalgia, incidentally noting he had missed approximately 3 weeks of dialysis and felt ill.  Laboratory studies indicated a severe metabolic acidosis and need for urgent dialysis, patient initially left AMA due to wait times and then returned 9/11 with persistent generalized symptoms and requested admission for HD. Due to bed availability, pt was transferred to [facility] for treatment.    ESRD with noncompliance of HD  Metabolic acidosis, anion-gap, resolved   Previously arranged to undergo HD MWF, however patient describes \"issues\" with his dialysis center ([facility]). Unwilling to describe what said issues were, only indicates he requested transfer to an alternative facility which was not performed and therefore he decided to stop attending. Last HD run appears to be 8/26/20 per Care Everywhere. When asked if he had been in contact with his nephrologist ([facility]) regarding issues at HD center, pt does not answer. Labwork obtained in ED indicate uncompensated anion gap metabolic acidosis with , electrolytes surprisingly wnl.   - Nephrology consulted, is s/p HD on 9/11 and 9/12, plan to run again 9/14 per home MWF schedule  - Care Coordinator/SW consult to assist with alternative HD location    Vomiting, resolved  Patient reporting AM of 9/13 he had emesisx2 after eating. Denies abdominal pain, cp, sob, diarrhea/constipation. Declined labs yesterday, discussed importance of monitoring electrolytes in context of reported vomiting. Afebrile without " evidence of sepsis. Abdominal exam benign, no concern of peritonitis. Etiology unclear.  - ADAT      Hx RCC of left kidney s/p nephrectomy  Per summaries via [employer], was on palliative chemo earlier this year. Prior scans indicated bladder wall thickening. Unclear if this was followed up as an outpatient. Appears to have been lost to follow-up with Oncology/Urology.   - Oncology/Urology follow-up recommended at discharge    Diabetes Mellitus Type II, A1C 5.5  Not on medications PTA. Last Hgb A1c is from 2019.  - Can d/c qid glucose monitoring    Hypertension  - PTA amlodipine    Hx HD catheter-related MSSA bacteremia  Hx IVC septic thrombophlebitis  Admission [facility] at [facility] and [facility] 06/2020 (left AMA from [facility], presented to [facility] for definitive care). Suspected related to femoral HD catheter, findings of occlusive iliac and non-occlusive IVC thrombus presumed septic. Also complicated by septic lung abscesses. TTE & JUNIOR neg for vegetations. Completed 6-weeks of antibiotic tx, previously on warfarin tx for thrombus. No longer on chronic AC - unclear how long patient was compliant with this medication.    COVID rule-out  Asymptomatic PCR performed 9/10, NEGATIVE.       Diet: Renal Diet (non-dialysis)  Room Service    DVT Prophylaxis: Pneumatic Compression Devices and Ambulate every shift  Sahu Catheter: not present  Code Status: Full Code           Disposition Plan   Expected discharge: Dialysis today, d/c pending new dialysis locations  Entered: Cindy Veronica PA-C 09/14/2020, 11:33 AM       The patient's care was discussed with the Bedside Nurse, Care Coordinator/ and Patient.    Cindy Veronica PA-C  Hospitalist Service  [facility]    ______________________________________________________________________    Interval History   Jabber used for  services    Denies concerns or complaints. Nausea resolved. No vomiting. Tolerated diet.      Data reviewed today: I reviewed all medications, new labs and imaging results over the last 24 hours. I personally reviewed no images or EKG's today.    Physical Exam   Vital Signs: Temp: 98.2  F (36.8  C) Temp src: Oral BP: 100/57 Pulse: 77   Resp: 20 SpO2: 100 % O2 Device: None (Room air)    Weight: 153 lbs 3.2 oz  Constitutional: Alert, resting comfortably in NAD  HEENT: Head normocephalic, atraumatic. Eyes sclera non icteric. Oropharynx clear and most  Respiratory: Normal effort, symmetric expansion, no crackles or wheezing  Cardiovascular: RRR no murmurs   GI: Non distended, normal bowels sounds, no tenderness or guarding  MSK: LE without edema. Dorsalis pedis pulse palpated bilaterally.   Skin/Integumen: Clear  Neuro: CN II-XII grossly intact  Psych:  Alert and oriented x 3. Normal affect      Data   Recent Labs   Lab 09/14/20  0820 09/13/20  1028 09/11/20  0803 09/10/20  1206   WBC  --  4.2 4.7 4.5   HGB  --  9.7* 9.4* 9.7*   MCV  --  86 87 88   PLT  --  157 214 229   * 133 139 136   POTASSIUM 3.6 3.9 4.5 4.7   CHLORIDE 96 99 108 106   CO2 23 26 14* 13*   BUN 83* 58* 160* 165*   CR 9.69* 8.35* 15.20* 14.60*   ANIONGAP 11 8 17* 17*   HENOK 8.7 8.6 9.1 9.1   * 117* 105* 162*   ALBUMIN  --  3.8  --  4.4   PROTTOTAL  --  8.3  --  9.6*   BILITOTAL  --  0.5  --  0.4   ALKPHOS  --  51  --  66   ALT  --  16  --  16   AST  --  16  --  11     No results found for this or any previous visit (from the past 24 hour(s)).

## 2020-09-14 NOTE — PLAN OF CARE
DATE & TIME: 9/14/20 5374-2959  Cognitive Concerns/ Orientation : A&OX4, speaks somalian/jabber in room  BEHAVIOR & AGGRESSION TOOL COLOR: Green  CIWA SCORE: N/A   ABNL VS/O2: VSS on RA  MOBILITY: SBA  PAIN MANAGMENT: denies  DIET: Renal diet, good appetite  BOWEL/BLADDER: Continent, on hemodialysis  ABNL LAB/BG: creat 9.69; , BG checks discontinued  DRAIN/DEVICES: Right tunnel cath, R PIV SL  TELEMETRY RHYTHM: NA  SKIN: WDL  TESTS/PROCEDURES: Dialysis today  D/C DAY/GOALS/PLACE: Pending OP dialysis plan  OTHER IMPORTANT INFO: none

## 2020-09-14 NOTE — PROGRESS NOTES
Renal Medicine Inpatient Dialysis Note                                       Assessment/Plan:     70 year old male c ESKD 2 DM2 / L nephrectomy admitted for dialysis via the [facility].  Recent history of dialysis non compliance    1.  ESRD   -R IJ access   -[facility] (Dr. Joy)   -recent noncompliance. Care co-ordinator working on [facility]   2.  Anemia - Hgb stable.   3.  Probable secondary hyperparathyroidism   4.  Metabolic acidosis   -corrected with dialsyis   5.  HTN  6.  DM2    Next HD on Wed      Interval History:     Seen/examined on dialysis.Patient dialyzed for 3.5 hrs. on a K3 bath with a net fluid removal of  1L.  A BFR of 400 ml/min was obtained via a CVC.  Interviewed patient using [facility]  with VIRIDIANA.  Pt eager to g home         ROS     R: NEGATIVE for significant cough or SOB  CV: NEGATIVE for chest pain, palpitations    Dialysis Parameters:     Vitals were reviewed  Patient Vitals for the past 12 hrs:   BP Temp Temp src Pulse Resp SpO2 Weight   09/14/20 1650 116/71 97.1  F (36.2  C) Oral 80 16 100 % --   09/14/20 1645 131/76 -- -- 81 -- -- --   09/14/20 1630 112/84 -- -- 89 -- -- --   09/14/20 1615 97/67 -- -- 86 -- -- --   09/14/20 1600 117/65 -- -- 86 -- -- --   09/14/20 1545 114/82 -- -- 82 -- -- --   09/14/20 1530 120/70 -- -- 84 -- -- --   09/14/20 1515 123/84 -- -- 82 -- -- --   09/14/20 1500 120/70 -- -- 82 -- -- --   09/14/20 1445 105/75 -- -- 86 -- -- --   09/14/20 1430 100/68 -- -- 90 -- -- --   09/14/20 1415 97/67 -- -- 86 -- -- --   09/14/20 1400 101/65 -- -- 80 -- -- --   09/14/20 1345 105/73 -- -- 84 -- -- --   09/14/20 1330 116/76 -- -- 83 -- -- --   09/14/20 1315 125/80 -- -- 77 -- -- --   09/14/20 1310 113/80 -- -- 76 -- -- --   09/14/20 1247 115/81 98.2  F (36.8  C) Oral 75 18 -- 69.6 kg (153 lb 6.4 oz)   09/14/20 0734 100/57 98.2  F (36.8  C) Oral 77 20 100 % --   09/14/20 0600 128/70 97.4  F (36.3  C) Axillary 85 16 99 % --      I/O last 3 completed shifts:  In: 240 [P.O.:240]  Out: -     Vitals:    09/11/20 1500 09/14/20 1247   Weight: 69.5 kg (153 lb 3.2 oz) 69.6 kg (153 lb 6.4 oz)       Current Weight:  ?  Dry Weight: 68 range ?  Dialysis Temp: 36.5  C  Access Device: IJ  Access Site: right  Dialyzer: Revaclear  Dialysis Bath: 2   Sodium Profile: n  UF Goal: 1 to 2  Blood Flow Rate (mL/min): 300  Total Treatment Time (hrs): 3  Heparin: Low dose as required      EPO dose: n  Zemplar: n        Medications and Allergies:     Reviewed      Physical Exam:     Seen and examined during course of dialysis run    /71   Pulse 80   Temp 97.1  F (36.2  C) (Oral)   Resp 16   Wt 69.6 kg (153 lb 6.4 oz)   SpO2 100%     GENERAL: awake, alert, follows  RESP: clear  CV: RRR, normal S1 S2  MS: no edema  SKIN: catheter site clean without drainage    Data:       Recent Labs   Lab 09/14/20  0820   *   POTASSIUM 3.6   CHLORIDE 96   CO2 23   ANIONGAP 11   *   BUN 83*   CR 9.69*   GFRESTIMATED 5*   GFRESTBLACK 6*   HENOK 8.7         Tyrel Wilcox MD  [facility] - Nephrology   [phone]

## 2020-09-14 NOTE — CONSULTS
Care Transition - RN  Care Coordinator met with pt on Friday concerning need for new dialysis unit through [facility], Friday's note:  Care Coordinator was asked by Nephrologist to help in finding a different [facility] dialysis unit.  Pt last had dialysis at the [facility] unit ([phone]) on 8/3/20.  He does not like this unit.  Conversed with pt with the Sarah Manzo , (that was a little difficult).  Pt in agreement for transition to another [facility] unit.  Contacted the [facility] Dialysis Coordinator Domi Paulino ([phone]).  Unfortunately, the next closest unit to pt, [facility], gave away the last chair time this AM.  The next closest unit is [facility] ([phone]).    Domi updated writer the transfer request has been put in, but there is an admission process.  This takes at least a day or two and unfortunately being Friday afternoon, this will not be done until Monday at the earliest.  If this is able to be expedited, Domi will call the floor over the weekend.    Called Domi Paulino this AM.  She is awaiting call back from the  from the [facility] unit.  She will call writer back this AM.           Care  (CTS) will continue to follow as needed.    Karli Emery RN  Inpatient Care Coordinator  [phone]

## 2020-09-14 NOTE — PLAN OF CARE
DATE & TIME: 9/13/20 4248-0788  Cognitive Concerns/ Orientation : A&OX4, speaks somalian/jabber in room  BEHAVIOR & AGGRESSION TOOL COLOR: Green  CIWA SCORE: NA   ABNL VS/O2: VSS on RA  MOBILITY: SBA  PAIN MANAGMENT: denies  DIET: Renal  BOWEL/BLADDER: Continent, on hemodialysis  ABNL LAB/BG: creat 8.35, BUN 58,  & 104  DRAIN/DEVICES: Right tunnel cath, R PIV SL  TELEMETRY RHYTHM: NA  SKIN: WDL  TESTS/PROCEDURES: will have dialysis on Monday  D/C DAY/GOALS/PLACE: Pending, possibly tomorrow after dialysis; need to establish where patient will dialyze upon discharge. CC consulted.  OTHER IMPORTANT INFO: occasional pain in R PIV with wrist movement, applied heat packs, helped.     MD/RN ROUNDING SIGNED OFF D/E SHIFT: n/a  COMMIT TO SIT DONE AND SIGNED OFF yes     Observation goals  PRIOR TO DISCHARGE      Comments: -diagnostic tests and consults completed and resulted NOT MET  -vital signs normal or at patient baseline MET  -safe disposition plan has been identified NOT MET  Nurse to notify provider when observation goals have been met and patient is ready for discharge.

## 2020-09-15 ENCOUNTER — APPOINTMENT (OUTPATIENT)
Dept: INTERPRETER SERVICES | Facility: CLINIC | Age: 70
End: 2020-09-15
Payer: COMMERCIAL

## 2020-09-15 VITALS
HEART RATE: 87 BPM | DIASTOLIC BLOOD PRESSURE: 64 MMHG | SYSTOLIC BLOOD PRESSURE: 107 MMHG | TEMPERATURE: 97.7 F | OXYGEN SATURATION: 100 % | WEIGHT: 153.4 LBS | RESPIRATION RATE: 18 BRPM

## 2020-09-15 PROCEDURE — 25000132 ZZH RX MED GY IP 250 OP 250 PS 637: Performed by: PHYSICIAN ASSISTANT

## 2020-09-15 PROCEDURE — G0378 HOSPITAL OBSERVATION PER HR: HCPCS

## 2020-09-15 PROCEDURE — 99217 ZZC OBSERVATION CARE DISCHARGE: CPT | Performed by: PHYSICIAN ASSISTANT

## 2020-09-15 RX ADMIN — CALCIUM ACETATE 667 MG: 667 CAPSULE ORAL at 08:29

## 2020-09-15 RX ADMIN — CALCIUM ACETATE 667 MG: 667 CAPSULE ORAL at 11:57

## 2020-09-15 NOTE — DISCHARGE SUMMARY
"[facility]  Hospitalist Discharge Summary      Date of Admission:  9/11/2020  Date of Discharge:  9/15/2020  Discharging Provider: Cindy Veronica PA-C      Discharge Diagnoses   ESRD with noncompliance of HD  Metabolic acidosis, anion-gap, resolved   Hx RCC of left kidney s/p nephrectomy  Diabetes Mellitus Type II, A1C 5.5  Hypertension  Hx HD catheter-related MSSA bacteremia  Hx IVC septic thrombophlebitis    Follow-ups Needed After Discharge   Follow-up Appointments     Follow-up and recommended labs and tests       Follow up with primary care provider, Tita Pearl, within 7 days for   hospital follow- up.  No follow up labs or test are needed.  Dialysis as outpatient             Unresulted Labs Ordered in the Past 30 Days of this Admission     Date and Time Order Name Status Description    9/10/2020 1224 Blood culture Preliminary     9/10/2020 1224 Blood culture Preliminary           Discharge Disposition   Discharged to home  Condition at discharge: Stable    Hospital Course   HPI from H&P per Maynor Israel PA-C  Gemma Kessler is a 70 year old male with a past medical history of ESRD, diabetes mellitus type 2, renal cell carcinoma of the left kidney status post nephrectomy who presented to the emergency department at [facility] on 9/10/2020 with a chief complaint of bilateral otalgia, incidentally noting he had missed approximately 3 weeks of dialysis and felt ill.  Laboratory studies indicated a severe metabolic acidosis and need for urgent dialysis, patient initially left AMA due to wait times and then returned 9/11 with persistent generalized symptoms and requested admission for HD. Due to bed availability, pt was transferred to [facility] for treatment.     Patient is presently evaluated in hospital room. History obtained via  services on iPad. Patient solely indicates he \"has not been feeling well.\" Does not give additional symptoms. " "When asked why he has not been attending dialysis, he indicates having problems with his current clinic and states he has asked to be transferred multiple times without effect. Due to frustration, he decided to stop going to dialysis. For unclear reasons, he did not contact his nephrologist or attempt to find an alternative means to obtain hemodialysis. Denies fever, chills, cough. Is making small amount of urine. Denies back/flank pain or discomfort, nausea/vomiting, chest pain.     ESRD with noncompliance of HD  Metabolic acidosis, anion-gap, resolved   Previously arranged to undergo HD MWF, however patient describes \"issues\" with his dialysis center ([facility]). Unwilling to describe what said issues were, only indicates he requested transfer to an alternative facility which was not performed and therefore he decided to stop attending. Last HD run appears to be 8/26/20 per Care Everywhere. When asked if he had been in contact with his nephrologist ([facility]) regarding issues at HD center, pt does not answer. Labwork obtained in ED indicate uncompensated anion gap metabolic acidosis with , electrolytes surprisingly wnl.   - Nephrology consulted, is s/p HD on 9/11, 9/12 and 9/14  - Care Coordinator arranged for new dialysis location prior to discharge    Hx RCC of left kidney s/p nephrectomy  Per summaries via [employer], was on palliative chemo earlier this year. Prior scans indicated bladder wall thickening. Unclear if this was followed up as an outpatient. Appears to have been lost to follow-up with Oncology/Urology.   - Oncology/Urology follow-up recommended at discharge    Diabetes Mellitus Type II, A1C 5.5  Not on medications PTA    Hypertension: PTA on Norvasc and HCTZ  - These medications were held throughout his hospitalization and his BP remained well controlled. Will discontinue on discharge. Follow up with PCP      Hx HD catheter-related MSSA bacteremia  Hx IVC " septic thrombophlebitis  Admission both at [facility] and [facility] in 06/2020 (left AMA from [facility], presented to [facility] for definitive care). Suspected related to femoral HD catheter, findings of occlusive iliac and non-occlusive IVC thrombus presumed septic. Also complicated by septic lung abscesses. TTE & JUNIOR neg for vegetations. Completed 6-weeks of antibiotic tx, previously on warfarin tx for thrombus. No longer on chronic AC - unclear how long patient was compliant with this medication.          Consultations This Hospital Stay   NEPHROLOGY IP CONSULT  CARE COORDINATOR IP CONSULT  CARE TRANSITION RN/SW IP CONSULT    Code Status   Full Code    Time Spent on this Encounter   I, Cindy Veronica PA-C, personally saw the patient today and spent greater than 30 minutes discharging this patient.       Cindy Veronica PA-C  [facility]  ______________________________________________________________________    Physical Exam   Vital Signs: Temp: 97.7  F (36.5  C) Temp src: Oral BP: 107/64 Pulse: 87   Resp: 18 SpO2: 100 % O2 Device: None (Room air)    Weight: 153 lbs 6.4 oz  See exam same date       Primary Care Physician   Tita Pearl    Discharge Orders      Follow-up and recommended labs and tests     Follow up with primary care provider, Tita Pearl, within 7 days for hospital follow- up.  No follow up labs or test are needed.  Dialysis as outpatient     Reason for your hospital stay    You were admitted for dialysis. You underwent dialysis x 3. A new dialysis location was found and please follow up as scheduled     Activity    Your activity upon discharge: activity as tolerated     Diet    Follow this diet upon discharge: Orders Placed This Encounter      Room Service      Renal Diet (non-dialysis)       Significant Results and Procedures   Most Recent 3 CBC's:  Recent Labs   Lab Test 09/13/20  1028 09/11/20  0803 09/10/20  1206   WBC 4.2 4.7 4.5   HGB 9.7* 9.4* 9.7*   MCV 86 87  88    214 229     Most Recent 3 BMP's:  Recent Labs   Lab Test 09/14/20  0820 09/13/20  1028 09/11/20  0803   * 133 139   POTASSIUM 3.6 3.9 4.5   CHLORIDE 96 99 108   CO2 23 26 14*   BUN 83* 58* 160*   CR 9.69* 8.35* 15.20*   ANIONGAP 11 8 17*   HENOK 8.7 8.6 9.1   * 117* 105*     Most Recent 3 INR's:No lab results found.  Most Recent Hemoglobin A1c:  Recent Labs   Lab Test 09/13/20  1028   A1C 5.5   ,   Results for orders placed or performed in visit on 06/30/17   CT Chest w/o Contrast    Narrative    EXAMINATION: CT CHEST W/O CONTRAST, 6/30/2017 11:26 AM    TECHNIQUE:  Helical CT images from the lung apices through the upper  abdomen were obtained without IV contrast.    COMPARISON: CT chest 10/31/2016    HISTORY: SOB, Renal failure, Chronic kidney disease, stage 3  (moderate), Chronic sinusitis, unspecified    FINDINGS:    Chest: Normal thyroid gland. Normal heart size. No pericardial  effusion. Normal caliber thoracic aorta and main pulmonary artery.  Scattered calcified mediastinal and hilar lymph nodes. No  lymphadenopathy in the chest by size criteria. Central  tracheobronchial tree is clear. No pleural effusion or pneumothorax.    Lungs: Interval increase in size of a right lower lobe solid pulmonary  nodule measuring 8 x 7 mm (series 5 image 299), previously measuring 6  mm on 10/31/2016 and 3 mm on 5/6/2015. The lesion previously appeared  more like a groundglass opacity and now is definitely solid. Stable 3  mm pulmonary nodule in the anterior right upper lobe (series 5 image  109). Remainder of the lungs are clear.    Upper abdomen: Cholecystectomy. Probable splenule near the tail of the  pancreas (series 3 image 64), partially visualized. Remainder of the  upper abdomen is unremarkable.    Bones and soft tissues: No acute or suspicious osseous abnormality.  Soft tissues are unremarkable.      Impression    IMPRESSION:   1. No abnormality in the chest to explain the patient's shortness  of  breath.  2. 8 mm right lower lobe solid pulmonary nodule has demonstrated slow  growth since 5/6/2015 at which time it measured 3 mm. Additionally,  the nodule previously appeared groundglass and now appears solid.  Tissue sampling should be strongly considered. Alternatively, PET/CT  could be considered, though a negative PET/CT result would not exclude  a low-grade adenocarcinoma.    Fleischner Society 2017 Guidelines:  http://pubs.rsna.org/doi/abs/10.1148/radiol.7846816877    I have personally reviewed the examination and initial interpretation  and I agree with the findings.    KARL TIDWELL MD       Discharge Medications   Current Discharge Medication List      CONTINUE these medications which have NOT CHANGED    Details   acetaminophen (TYLENOL) 500 MG tablet Take 500-1,000 mg by mouth every 8 hours as needed for mild pain      calcium acetate (PHOSLO) 667 MG CAPS capsule Take 667 mg by mouth 3 times daily (with meals)      cetirizine (ZYRTEC) 10 MG tablet Take 10 mg by mouth daily      fluticasone (FLONASE) 50 MCG/ACT nasal spray Spray 1 spray into both nostrils daily      nitroGLYcerin (NITROSTAT) 0.4 MG sublingual tablet Place 0.4 mg under the tongue every 5 minutes as needed for chest pain For chest pain place 1 tablet under the tongue every 5 minutes for 3 doses. If symptoms persist 5 minutes after 1st dose call 911.      simvastatin (ZOCOR) 20 MG tablet Take 20 mg by mouth At Bedtime      tamsulosin (FLOMAX) 0.4 MG capsule Take 0.4 mg by mouth daily         STOP taking these medications       amLODIPine (NORVASC) 10 MG tablet Comments:   Reason for Stopping:         hydrochlorothiazide (HYDRODIURIL) 25 MG tablet Comments:   Reason for Stopping:         WARFARIN SODIUM PO Comments:   Reason for Stopping:             Allergies   No Known Allergies

## 2020-09-15 NOTE — DISCHARGE INSTRUCTIONS
Outpatient dialysis:  [facility]  Monday, Wednesday, Friday at 9:30 AM, you need to be there at 8:30 AM (tomorrow)for your first run.  [address] [place] [address]  ([phone])    [employer] will pick you up tomorrow (9/16) between 7:45-8:15 in the morning for dialysis.  You erin to call them at [phone] for your ride home.

## 2020-09-15 NOTE — PROGRESS NOTES
We have a confirmed chair time for pt's outpatient dialysis:  Levine, Susan. \Hospital Has a New Name and Outlook.\"" Leandro Pope  M/W/F at 9:30 AM, the 1st run he will need to be there at 8:30 AM (tomorrow)  [address]  [phone]  Unfortunately, Care Coordinator spent over 60 minutes with pt and a [place]  thru the HOSTING .  This was not successful, as pt seemed to not understand or would complain he could not hear them, even though the conversation seemed loud and clear.   Discussed with pt's Nurse Nessa and Charge RN Svetlana.  Svetlana will try to get an in person .    The other key piece that needs to be communicated is how is pt going to get there.  He will need to have something set-up today.  Pt lives alone.  Feel he is at huge risk for not following through and the language barrier is an added concern.    Addendum:In looking through documents from his admission at [facility] 6/5/20-6/18/20 after he left [facility] and diagnosed with staph aureus bacteremia, found his [facility]  Esthela ([phone]).  Have left her a detailed message.  Have called [facility] ([phone]) three times this afternoon with each time on hold for over 20 minutes, presently still on hold.  Need to inquire on their ability to provide transportation to dialysis.  Conversed also with the [facility] unit.  They noted pt would just randomly show up, not at his designated time.    Addendum: Transportation has been set-up through his [employer] for ride home today between 3:00-3:30PM.  This transportation company will call the floor prior to their arrival and will pick pt up at Door [address].  Have also set-up transportation to his dialysis unit through September.  Due to not sure when he would be finishing his dailysis, pt or dialysis staff will need to call ([phone]) for return to home.  Writer will call the dialysis unit and update them on the transportation and send the  Discharge Summary when available.

## 2020-09-15 NOTE — PROGRESS NOTES
[facility] is continuing to work on securing outpt dialysis unit.  They are now looking at the [place] unit.  The [facility] Liaison, Domi Paulino, will contact writer when chair time confirmed.

## 2020-09-15 NOTE — PROGRESS NOTES
"    [facility]    Medicine Progress Note - Hospitalist Service       Date of Admission:  9/11/2020  Assessment & Plan       Gemma Kessler is a 70 year old male with a past medical history of ESRD, diabetes mellitus type 2, renal cell carcinoma of the left kidney status post nephrectomy who presented to the emergency department at [facility] on 9/10/2020 with a chief complaint of bilateral otalgia, incidentally noting he had missed approximately 3 weeks of dialysis and felt ill.  Laboratory studies indicated a severe metabolic acidosis and need for urgent dialysis, patient initially left AMA due to wait times and then returned 9/11 with persistent generalized symptoms and requested admission for HD. Due to bed availability, pt was transferred to [facility] for treatment.    ESRD with noncompliance of HD  Metabolic acidosis, anion-gap, resolved   Previously arranged to undergo HD MWF, however patient describes \"issues\" with his dialysis center ([facility]). Unwilling to describe what said issues were, only indicates he requested transfer to an alternative facility which was not performed and therefore he decided to stop attending. Last HD run appears to be 8/26/20 per Care Everywhere. When asked if he had been in contact with his nephrologist ([facility]) regarding issues at HD center, pt does not answer. Labwork obtained in ED indicate uncompensated anion gap metabolic acidosis with , electrolytes surprisingly wnl.   - Nephrology consulted, is s/p HD on 9/11, 9/12 and 9/14  - Care Coordinator/SW consult to assist with alternative HD location. Options are limited  - Patient initially demanding to leave. We discussed he doesn't currently have a place to attend dialysis. Explained that it takes coordination given limited number of beds/time slots at dialysis. Currently agreeable to stay but discussed risks if he demands to leave with no " dialysis plan in place and that it would be AMA      Hx RCC of left kidney s/p nephrectomy  Per summaries via [employer], was on palliative chemo earlier this year. Prior scans indicated bladder wall thickening. Unclear if this was followed up as an outpatient. Appears to have been lost to follow-up with Oncology/Urology.   - Oncology/Urology follow-up recommended at discharge    Diabetes Mellitus Type II, A1C 5.5  Not on medications PTA. Last Hgb A1c is from 2019.  - Can d/c qid glucose monitoring    Hypertension  - PTA amlodipine    Hx HD catheter-related MSSA bacteremia  Hx IVC septic thrombophlebitis  Admission both at [facility] and [facility] in 06/2020 (left AMA from [facility], presented to [facility] for definitive care). Suspected related to femoral HD catheter, findings of occlusive iliac and non-occlusive IVC thrombus presumed septic. Also complicated by septic lung abscesses. TTE & JUNIOR neg for vegetations. Completed 6-weeks of antibiotic tx, previously on warfarin tx for thrombus. No longer on chronic AC - unclear how long patient was compliant with this medication.    COVID rule-out  Asymptomatic PCR performed 9/10, NEGATIVE.         Diet: Renal Diet (non-dialysis)  Room Service    DVT Prophylaxis: Pneumatic Compression Devices  Sahu Catheter: not present  Code Status: Full Code           Disposition Plan   Expected discharge: Pending discharge dialysis plan  Entered: Cindy Veronica PA-C 09/15/2020, 10:22 AM       The patient's care was discussed with the Bedside Nurse, Care Coordinator/ and Patient.    Cindy Veronica PA-C  Hospitalist Service  [facility]    ______________________________________________________________________    Interval History   Patient seen using assistance of the jabber    Denies concerns or complaints. RN indicates patient demanding to leave. We discussed the coordination involved in obtaining a new dialysis and his need to remain in  the hospital until this in place    Data reviewed today: I reviewed all medications, new labs and imaging results over the last 24 hours. I personally reviewed no images or EKG's today.    Physical Exam   Vital Signs: Temp: 97.7  F (36.5  C) Temp src: Oral BP: 107/64 Pulse: 87   Resp: 18 SpO2: 100 % O2 Device: None (Room air)    Weight: 153 lbs 6.4 oz  Constitutional: Alert, resting comfortably in NAD  HEENT: Head normocephalic, atraumatic. Eyes sclera non icteric. Oropharynx clear and most  Respiratory: Normal effort, symmetric expansion, no crackles or wheezing  Cardiovascular: RRR no murmurs   GI: Non distended, normal bowels sounds, no tenderness or guarding  MSK: LE without edema. Dorsalis pedis pulse palpated bilaterally.   Skin/Integumen: Clear  Neuro: CN II-XII grossly intact  Psych:  Alert and oriented x 3. Normal affect      Data   Recent Labs   Lab 09/14/20  0820 09/13/20  1028 09/11/20  0803 09/10/20  1206   WBC  --  4.2 4.7 4.5   HGB  --  9.7* 9.4* 9.7*   MCV  --  86 87 88   PLT  --  157 214 229   * 133 139 136   POTASSIUM 3.6 3.9 4.5 4.7   CHLORIDE 96 99 108 106   CO2 23 26 14* 13*   BUN 83* 58* 160* 165*   CR 9.69* 8.35* 15.20* 14.60*   ANIONGAP 11 8 17* 17*   HENOK 8.7 8.6 9.1 9.1   * 117* 105* 162*   ALBUMIN  --  3.8  --  4.4   PROTTOTAL  --  8.3  --  9.6*   BILITOTAL  --  0.5  --  0.4   ALKPHOS  --  51  --  66   ALT  --  16  --  16   AST  --  16  --  11     No results found for this or any previous visit (from the past 24 hour(s)).

## 2020-09-15 NOTE — PLAN OF CARE
DATE & TIME: 9/15/20 0104-2482  Cognitive Concerns/ Orientation : A&OX4, speaks somalian/jabber in room  BEHAVIOR & AGGRESSION TOOL COLOR: Green  CIWA SCORE: N/A   ABNL VS/O2: VSS on RA  MOBILITY: SBA  PAIN MANAGMENT: denies  DIET: Renal diet, good appetite  BOWEL/BLADDER: Continent, on hemodialysis  ABNL LAB/BG: refused lab draw today  DRAIN/DEVICES: Right tunnel cath  TELEMETRY RHYTHM: NA  SKIN: WDL  TESTS/PROCEDURES: n/a  D/C DAY/GOALS/PLACE: Pending OP dialysis plan  OTHER IMPORTANT INFO: none    Discharge    PIV removed. Discharge instructions reviewed with pt via sheeba, questions answered.     Patient discharged to home, transportation provided by insurance via cab      Listed belongings gathered and returned to patient. Yes  Care Plan and Patient education resolved: Yes  Prescriptions if needed, hard copies sent with patient  NA  Home and hospital acquired medications returned to patient: NA  Medication Bin checked and emptied on discharge Yes  Follow up appointment made for patient: Yes

## 2020-09-15 NOTE — PLAN OF CARE
DATE & TIME: 9/14/20 4730-7794  Cognitive Concerns/ Orientation : A&OX4, speaks somalian/jabber in room  BEHAVIOR & AGGRESSION TOOL COLOR: Green  CIWA SCORE: N/A   ABNL VS/O2: VSS on RA  MOBILITY: SBA  PAIN MANAGMENT: denies  DIET: Renal diet, good appetite  BOWEL/BLADDER: Continent, on hemodialysis  ABNL LAB/BG: creat 9.69;  DRAIN/DEVICES: Right tunnel cath, R PIV SL  TELEMETRY RHYTHM: NA  SKIN: WDL  TESTS/PROCEDURES: Pt received Hemodialysis today, plan is for pt to discharge soon after new dialysis clinic is found.   D/C DAY/GOALS/PLACE: Pending, possibly today after dialysis; need to establish where patient will dialyze upon discharge. CC consulted.

## 2020-09-15 NOTE — PROGRESS NOTES
Pt irritable this am, demanding to leave. Sarah utilized to explain RN CC looking for OP dialysis unit. Pt refused lab draw.

## 2020-09-16 LAB
BACTERIA SPEC CULT: NO GROWTH
BACTERIA SPEC CULT: NO GROWTH
SPECIMEN SOURCE: NORMAL
SPECIMEN SOURCE: NORMAL